# Patient Record
Sex: FEMALE | Race: WHITE | Employment: FULL TIME | URBAN - METROPOLITAN AREA
[De-identification: names, ages, dates, MRNs, and addresses within clinical notes are randomized per-mention and may not be internally consistent; named-entity substitution may affect disease eponyms.]

---

## 2019-03-28 ENCOUNTER — OFFICE VISIT (OUTPATIENT)
Dept: ORTHOPEDIC SURGERY | Age: 49
End: 2019-03-28

## 2019-03-28 VITALS
WEIGHT: 145.4 LBS | TEMPERATURE: 97.8 F | HEART RATE: 63 BPM | BODY MASS INDEX: 22.82 KG/M2 | OXYGEN SATURATION: 98 % | HEIGHT: 67 IN | SYSTOLIC BLOOD PRESSURE: 131 MMHG | DIASTOLIC BLOOD PRESSURE: 75 MMHG

## 2019-03-28 DIAGNOSIS — S06.0X0A CONCUSSION WITHOUT LOSS OF CONSCIOUSNESS, INITIAL ENCOUNTER: Primary | ICD-10-CM

## 2019-03-28 RX ORDER — ONDANSETRON 4 MG/1
4 TABLET, FILM COATED ORAL
Qty: 21 TAB | Refills: 0 | Status: SHIPPED | OUTPATIENT
Start: 2019-03-28 | End: 2019-05-30 | Stop reason: SDUPTHER

## 2019-03-28 RX ORDER — NORTRIPTYLINE HYDROCHLORIDE 10 MG/1
10 CAPSULE ORAL
Qty: 90 CAP | Refills: 0 | Status: SHIPPED | OUTPATIENT
Start: 2019-03-28 | End: 2019-03-28 | Stop reason: SDUPTHER

## 2019-03-28 RX ORDER — ONDANSETRON 4 MG/1
4 TABLET, FILM COATED ORAL
COMMUNITY
End: 2019-03-28 | Stop reason: SDUPTHER

## 2019-03-28 RX ORDER — NORTRIPTYLINE HYDROCHLORIDE 10 MG/1
10 CAPSULE ORAL
Qty: 90 CAP | Refills: 0 | Status: SHIPPED | OUTPATIENT
Start: 2019-03-28 | End: 2019-04-01 | Stop reason: SINTOL

## 2019-03-28 RX ORDER — IBUPROFEN 200 MG
TABLET ORAL
COMMUNITY

## 2019-03-28 RX ORDER — LEVOTHYROXINE SODIUM 75 UG/1
88 TABLET ORAL
COMMUNITY

## 2019-03-28 NOTE — PROGRESS NOTES
AVS reviewed: YES  HEP: AT reviewed  Resources Provided: YES Both Videos & Photos  Patient questions/concerns answered: YES  Patient verbalized understanding of treatment plan: YES

## 2019-03-28 NOTE — LETTER
NOTIFICATION RETURN TO WORK / SCHOOL 
 
3/28/2019 10:50 AM 
 
Ms. Shaina Silver [address] [address] To Whom It May Concern: 
 
Shaina Silver is currently under the care of [address] [place] [address]. She will not work in any capacity due to medical condition and will follow-up with me on 4/4/19. If there are questions or concerns please have the patient contact our office.  
 
 
 
Sincerely, 
 
 
Kennis Cogan, DO

## 2019-03-28 NOTE — PROGRESS NOTES
HISTORY OF PRESENT ILLNESS    Franky Park is a 51y.o. year old female that comes in today as new patient At the request of Dr. 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 It has slightly improved with ibuprofen. Pain rated 5/10 top/frontal head and described as throbbing. Worsened with computer scree and most TV watching. Was eval by work comp doctor at [facility] [address] and then to ER as concern about bleed which was ruled out at ER and using zofran and benadryl. Photophobia: no Phonophobia: yes Sleep issues: yes More emotional: yes Dizziness: yes Nausea: yes LOC: no Trouble concentrating/foggy feeling: yes    Has Hx concussion as 16yo lasted about 2-3 weeks. History reviewed. No pertinent surgical history. Social History     Socioeconomic History    Marital status: UNKNOWN     Spouse name: Not on file    Number of children: Not on file    Years of education: Not on file    Highest education level: Not on file   Tobacco Use    Smoking status: Never Smoker    Smokeless tobacco: Never Used   Substance and Sexual Activity    Alcohol use: Yes     Alcohol/week: 0.6 oz     Types: 1 Glasses of wine per week     Comment: rarely     Drug use: Never     Current Outpatient Medications   Medication Sig Dispense Refill    levothyroxine (SYNTHROID) 75 mcg tablet Take  by mouth Daily (before breakfast).  ondansetron hcl (ZOFRAN) 4 mg tablet Take 4 mg by mouth every eight (8) hours as needed for Nausea.  ibuprofen (MOTRIN IB) 200 mg tablet Take  by mouth. History reviewed. No pertinent past medical history. Family History   Problem Relation Age of Onset    Hypertension Mother     Cancer Mother          ROS:  All other systems reviewed and negative aside from that written in the HPI.       Objective:  /75   Pulse 63   Temp 97.8 °F (36.6 °C) (Oral)   Ht 5' 7\" (1.702 m)   Wt 145 lb 6.4 oz (66 kg)   SpO2 98%   BMI 22.77 kg/m²   GEN: Appears stated age in NAD. EYES: Conjunctiva and lids normal.  PERRL.  ENT: External ears and nose without lesions/trauma. Hearing Intact. Tongue midline. NECK: supple, non-tender and symmetrical.  Spurling negative  HEART: no peripheral edema  LUNGS: Normal effort  NEURO:  CN 2-12 grossly Intact. DTRs normal biceps, triceps, patellar and Achilles bilateral .  Sensation intact to light touch.  within normal limits rapid alternating movements. Romberg/pronator drift within normal limits. Tandem leg balance test (VINAY) positive - 3 errors in 5 sec. Conjugate gaze to 10+cm w/ Sx.  MS: Gait and Station normal.  no clubbing/cyanosis. Strength/tone normal throughout upper and lower extremities. SKIN: Warm/dry w/o rash. HEENT: Conjunctiva/lids WNL. External canals/nares WNL. Tongue midline. PERRL, EOMI. Hearing intact. NECK: Trachea midline. Supple, Full ROM. No thyromegaly. CARDIAC: No edema. LUNGS: Normal effort. ABD: Soft, no masses. No HSM. PSYCH: A+O x3. Appropriate judgment and insight. Assessment/Plan:     ICD-10-CM ICD-9-CM    1. Concussion without loss of consciousness, initial encounter S06.0X0A 850.0 nortriptyline (PAMELOR) 10 mg capsule      ondansetron hcl (ZOFRAN) 4 mg tablet      REFERRAL TO PHYSICAL THERAPY       Patient (or guardian if minor) verbalizes understanding of evaluation and plan. Will start HEP and PT and Rx zofran fror PRN and titrate up pamelor and RTC 1 week. Letter off work.

## 2019-03-28 NOTE — PATIENT INSTRUCTIONS
Stare at the tip of a pen or pencil and bring toward your eye until it just starts to bother you. Hold for 5 seconds then move it away. Repeat 10 times a set, and do 3 sets a day.              Search YouTube for my channel:    Dr. Memo Desai

## 2019-04-01 ENCOUNTER — TELEPHONE (OUTPATIENT)
Dept: ORTHOPEDIC SURGERY | Age: 49
End: 2019-04-01

## 2019-04-01 DIAGNOSIS — S06.0X0A CONCUSSION WITHOUT LOSS OF CONSCIOUSNESS, INITIAL ENCOUNTER: Primary | ICD-10-CM

## 2019-04-01 DIAGNOSIS — S06.0X0A CONCUSSION WITHOUT LOSS OF CONSCIOUSNESS, INITIAL ENCOUNTER: ICD-10-CM

## 2019-04-01 RX ORDER — MEMANTINE HYDROCHLORIDE 5 MG/1
5 TABLET ORAL DAILY
Qty: 30 TAB | Refills: 1 | Status: SHIPPED | OUTPATIENT
Start: 2019-04-01 | End: 2019-06-13 | Stop reason: ALTCHOICE

## 2019-04-01 RX ORDER — MEMANTINE HYDROCHLORIDE 5 MG/1
5 TABLET ORAL DAILY
Qty: 30 TAB | Refills: 1 | Status: SHIPPED | OUTPATIENT
Start: 2019-04-01 | End: 2019-04-01 | Stop reason: SDUPTHER

## 2019-04-01 NOTE — TELEPHONE ENCOUNTER
Pt called stating nortriptyline (PAMELOR) 10 mg capsule gives her vivid dreams and she doesn't sleep but 10-15 minutes at a time. She went to 20mg last night and it was completely worse. She did say in the past she was on [place] and was wondering if she could go back to it. Please advise back at [phone].

## 2019-04-01 NOTE — TELEPHONE ENCOUNTER
Kerri. Have her stop the pamelor and I sent a Rx for namenda 5mg to start at night and she can increase to 10mg after 5 days of needed.

## 2019-04-01 NOTE — TELEPHONE ENCOUNTER
Called and confirmed with pharmacy that they had received the Rx for Namenda that Dr. Cyndy Hartley resent. Pharmacy staff mentioned that they have two patient profiles for the Pt and will make a note to merge the two of them.

## 2019-04-01 NOTE — TELEPHONE ENCOUNTER
Called and informed Pt that confirmed that pharmacy has received Rx and it is filled. Pt was informed that she should bring current insurance and workman's comp information to pharmacy when picking up. Pt stated that that should all be on file and has spoken with  who informed her that the Rx still needs to be approved through her so it may be another day before Rx can be picked up.

## 2019-04-02 ENCOUNTER — DOCUMENTATION ONLY (OUTPATIENT)
Dept: ORTHOPEDIC SURGERY | Age: 49
End: 2019-04-02

## 2019-04-02 NOTE — PROGRESS NOTES
Called , Annabella Rubio, to see where we could send Pt to Radha Adam was confused why there was a problem w/ [place] and stated that she would take care of it.

## 2019-04-03 ENCOUNTER — APPOINTMENT (OUTPATIENT)
Dept: PHYSICAL THERAPY | Age: 49
End: 2019-04-03

## 2019-04-04 ENCOUNTER — OFFICE VISIT (OUTPATIENT)
Dept: ORTHOPEDIC SURGERY | Age: 49
End: 2019-04-04

## 2019-04-04 VITALS
DIASTOLIC BLOOD PRESSURE: 78 MMHG | BODY MASS INDEX: 23.01 KG/M2 | HEIGHT: 67 IN | TEMPERATURE: 98.1 F | RESPIRATION RATE: 14 BRPM | HEART RATE: 65 BPM | WEIGHT: 146.6 LBS | SYSTOLIC BLOOD PRESSURE: 130 MMHG

## 2019-04-04 DIAGNOSIS — S06.0X0D CONCUSSION WITHOUT LOSS OF CONSCIOUSNESS, SUBSEQUENT ENCOUNTER: Primary | ICD-10-CM

## 2019-04-04 NOTE — PROGRESS NOTES
HISTORY OF PRESENT ILLNESS    Nguyễn Cedillo is a 51y.o. year old female that comes in today for follow-up: concussion    Since last appt Sx are somewhat improved but a lot of issues with driving child to school then came here  It has slightly improved with namenda as could not tolerate pamelor at 20mg. Patient has tried:  Tylenol and rest/darrk-quiet room Pain rated  6/10 top right head and described as throbbing. Did have bad squeeze feeling back of head Mon and Tues night and lasted several hours. Still has not figured out [facility] for PT as issues with fee schedule. [facility] improving. Photophobia: yes Phonophobia: yes Sleep issues: yes More emotional: improved Dizziness: yes but improved Nausea: yes better w/ zofran LOC: no Trouble concentrating/foggy feeling: improving    Social History     Socioeconomic History    Marital status: UNKNOWN     Spouse name: Not on file    Number of children: Not on file    Years of education: Not on file    Highest education level: Not on file   Tobacco Use    Smoking status: Never Smoker    Smokeless tobacco: Never Used   Substance and Sexual Activity    Alcohol use: Yes     Alcohol/week: 0.6 oz     Types: 1 Glasses of wine per week     Comment: rarely     Drug use: Never     Current Outpatient Medications   Medication Sig Dispense Refill    memantine (NAMENDA) 5 mg tablet Take 1 Tab by mouth daily. May increase to 10mg after 5 days if needed. 30 Tab 1    levothyroxine (SYNTHROID) 75 mcg tablet Take  by mouth Daily (before breakfast).  ondansetron hcl (ZOFRAN) 4 mg tablet Take 1 Tab by mouth every eight (8) hours as needed for Nausea. 21 Tab 0    ibuprofen (MOTRIN IB) 200 mg tablet Take  by mouth. History reviewed. No pertinent past medical history.   Family History   Problem Relation Age of Onset    Hypertension Mother     Cancer Mother          ROS:  All other systems reviewed and negative aside from that written in the HPI.      Objective:  /78   Pulse 65   Temp 98.1 °F (36.7 °C)   Resp 14   Ht 5' 7\" (1.702 m)   Wt 146 lb 9.6 oz (66.5 kg)   BMI 22.96 kg/m²   GEN: Appears stated age in NAD. EYES: Conjunctiva and lids normal.  PERRL.  ENT: External ears and nose without lesions/trauma. Hearing Intact. Tongue midline. NECK: supple, non-tender and symmetrical.  Spurling negative  HEART: no peripheral edema  LUNGS: Normal effort  NEURO:  CN 2-12 grossly Intact. DTRs normal biceps, triceps, patellar and Achilles bilateral .  Sensation intact to light touch.  within normal limits rapid alternating movements. Romberg/pronator drift within normal limits. Tandem leg balance test (VINAY) negative. Conjugate gaze to 12cm w/ Sx.  MS: Gait and Station normal.  no clubbing/cyanosis. Strength/tone normal throughout upper and lower extremities. SKIN: Warm/dry w/o rash. HEENT: Conjunctiva/lids WNL. External canals/nares WNL. Tongue midline. PERRL, EOMI. Hearing intact. NECK: Trachea midline. Supple, Full ROM. No thyromegaly. CARDIAC: No edema. LUNGS: Normal effort. ABD: Soft, no masses. No HSM. PSYCH: A+O x3. Appropriate judgment and insight. Assessment/Plan:     ICD-10-CM ICD-9-CM    1. Concussion without loss of consciousness, subsequent encounter S06.0X0D V58.89      850.0      Patient (or guardian if minor) verbalizes understanding of evaluation and plan. Time with Pt 26 minutes, >50% of which was counseling pt regarding Dx and Tx options and coordination of care. Slowly improving with namenda and avoiding activity. Will add melatonin OTC slowly and titrate naemda as tolerated and RTC 1 week. Continue eye HEP and await PT scheduling.

## 2019-04-04 NOTE — LETTER
4/4/2019 8:42 AM 
 
Ms. Jamel Mendoza 4 [address] [address] 
  
  
To Whom It May Concern: 
  
Pilo Jones is currently under the care of [address] [place] [address]. 
  
She will not work in any capacity due to medical condition and will follow-up with me on 4/11/19. 
  
If there are questions or concerns please have the patient contact our office.  
 
 
 
 
Sincerely, 
 
 
Susana Arciniega, DO

## 2019-04-04 NOTE — PROGRESS NOTES
AVS reviewed: YES  HEP: N/A  Resources Provided: YES Work note  Patient questions/concerns answered: YES.  Gave update again on status of PT  Patient verbalized understanding of treatment plan: YES

## 2019-04-04 NOTE — LETTER
NOTIFICATION RETURN TO WORK / SCHOOL 
 
4/4/2019 8:34 AM 
 
Ms. Joann Pena 4 [address] [address] To Whom It May Concern: 
 
Joann Pena is currently under the care of [address] [place] [address]. She will not work in any capacity due to medical condition and will follow-up with me on 4/11/19. If there are questions or concerns please have the patient contact our office.  
 
 
 
Sincerely, 
 
 
Jamari Jack, DO

## 2019-04-04 NOTE — PROGRESS NOTES
Pt reports this morning is worse she has felt in a while; had to do a lot of driving  Took Namenda last night, was able to go to sleep. But woke up at 2am w/ pounding ALICEA, took Tylenol & then woke up at 6am and still had HA  In general, has gotten better. Less use of Tylenol & Zofran.  Screens, driving (intersections w/ cross traffic) are aggravating activity

## 2019-04-04 NOTE — LETTER
NOTIFICATION RETURN TO WORK / SCHOOL 
 
4/4/2019 8:44 AM 
 
Ms. Mony Lind 4 [address] [address] 
  
  
To Whom It May Concern: 
  
Mony Lind is currently under the care of [address] [place] [address]. 
  
She will not work in any capacity due to medical condition and will follow-up with me on 4/11/19. 
  
If there are questions or concerns please have the patient contact our office.  
 
 
 
Sincerely, 
 
 
Ham Ackerman, DO

## 2019-04-04 NOTE — Clinical Note
Please call [address] PT at [facility] and let them know about her and fax the order over to see if we can get her started there (or another location if needed). Thanks Ronald.

## 2019-04-04 NOTE — PATIENT INSTRUCTIONS
Concussion: Care Instructions  Your Care Instructions    A concussion is a kind of injury to the brain. It happens when the head receives a hard blow. The impact can jar or shake the brain against the skull. This interrupts the brain's normal activities. Although you may have cuts or bruises on your head or face, you may have no other visible signs of a brain injury. In most cases, damage to the brain from a concussion can't be seen in tests such as a CT or MRI scan. For a few weeks, you may have low energy, dizziness, trouble sleeping, a headache, ringing in your ears, or nausea. You may also feel anxious, grumpy, or depressed. You may have problems with memory and concentration. These symptoms are common after a concussion. They should slowly improve over time. Sometimes this takes weeks or even months. Someone who lives with you should know how to care for you. Please share this and all information with a caregiver who will be available to help if needed. Follow-up care is a key part of your treatment and safety. Be sure to make and go to all appointments, and call your doctor if you are having problems. It's also a good idea to know your test results and keep a list of the medicines you take. How can you care for yourself at home? Pain control  · Put ice or a cold pack on the part of your head that hurts for 10 to 20 minutes at a time. Put a thin cloth between the ice and your skin. · Be safe with medicines. Read and follow all instructions on the label. ? If the doctor gave you a prescription medicine for pain, take it as prescribed. ? If you are not taking a prescription pain medicine, ask your doctor if you can take an over-the-counter medicine. Recovery  · Follow your doctor's instructions. He or she will tell you if you need someone to watch you closely for the next 24 hours or longer. · Rest is the best way to recover from a concussion.  You need to rest your body and your brain:  ? Get plenty of sleep at night. And take rest breaks during the day. ? Avoid activities that take a lot of physical or mental work. This includes housework, exercise, schoolwork, video games, text messaging, and using the computer. ? You may need to change your school or work schedule while you recover. ? Return to your normal activities slowly. Do not try to do too much at once. · Do not drink alcohol or use illegal drugs. Alcohol and illegal drugs can slow your recovery. And they can increase your risk of a second brain injury. · Avoid activities that could lead to another concussion. Follow your doctor's instructions for a gradual return to activity and sports. · Ask your doctor when it's okay for you to drive a car, ride a bike, or operate machinery. How should you return to activity? Your return to activity can begin after 1 to 2 days of physical and mental rest. After resting, you can gradually increase your activity as long as it does not cause new symptoms or worsen your symptoms. Doctors and concussion specialists suggest steps to follow for returning to sports after a concussion. Use these steps as a guide. You should slowly progress through the following levels of activity:  1. Limited activity. You can take part in daily activities as long as the activity doesn't increase your symptoms or cause new symptoms. 2. Light aerobic activity. This can include walking, swimming, or other exercise at less than 70% of maximum heart rate. No resistance training is included in this step. 3. Sport-specific exercise. This includes running drills or skating drills (depending on the sport), but no head impact. 4. Noncontact training drills. This includes more complex training drills such as passing. The athlete may also begin light resistance training. 5. Full-contact practice. The athlete can participate in normal training. 6. Return to normal game play.  This is the final step and allows the athlete to join in normal game play. Watch and keep track of your progress. It should take at least 6 days for you to go from light activity to normal game play. Make sure that you can stay at each new level of activity for at least 24 hours without symptoms, or as long as your doctor says, before doing more. If one or more symptoms come back, return to a lower level of activity for at least 24 hours. Don't move on until all symptoms are gone. When should you call for help? Call 911 anytime you think you may need emergency care. For example, call if:    · You have a seizure.     · You passed out (lost consciousness).     · You are confused or can't stay awake.    Call your doctor now or seek immediate medical care if:    · You have new or worse vomiting.     · You feel less alert.     · You have new weakness or numbness in any part of your body.    Watch closely for changes in your health, and be sure to contact your doctor if:    · You do not get better as expected.     · You have new symptoms, such as headaches, trouble concentrating, or changes in mood. Where can you learn more? Go to http://bekah-itz.info/. Enter L141 in the search box to learn more about \"Concussion: Care Instructions. \"  Current as of: Madeline 3, 2018  Content Version: 11.9  © 8566-1987 [employer]. Care instructions adapted under license by [employer] (which disclaims liability or warranty for this information). If you have questions about a medical condition or this instruction, always ask your healthcare professional. [place] [address] any warranty or liability for your use of this information.

## 2019-04-11 ENCOUNTER — OFFICE VISIT (OUTPATIENT)
Dept: ORTHOPEDIC SURGERY | Age: 49
End: 2019-04-11

## 2019-04-11 VITALS
WEIGHT: 149 LBS | BODY MASS INDEX: 23.39 KG/M2 | SYSTOLIC BLOOD PRESSURE: 130 MMHG | HEART RATE: 67 BPM | DIASTOLIC BLOOD PRESSURE: 79 MMHG | HEIGHT: 67 IN | TEMPERATURE: 97.6 F | RESPIRATION RATE: 15 BRPM

## 2019-04-11 DIAGNOSIS — S06.0X0D CONCUSSION WITHOUT LOSS OF CONSCIOUSNESS, SUBSEQUENT ENCOUNTER: Primary | ICD-10-CM

## 2019-04-11 RX ORDER — ACETAMINOPHEN 325 MG/1
TABLET ORAL
COMMUNITY

## 2019-04-11 NOTE — PROGRESS NOTES
AVS reviewed: YES  HEP: N/A  Resources Provided: YES work note  Patient questions/concerns answered: YES, Pt requested work note  Patient verbalized understanding of treatment plan: YES

## 2019-04-11 NOTE — LETTER
4/11/2019 8:55 AM 
 
Ms. Hill Sanchez 4 [address] [address] To Whom It May Concern: 
  
Hill Sanchez is currently under the care of [address] [place] [address]. 
  
She will not work in any capacity due to medical condition and will follow-up with me in two weeks. 
  
If there are questions or concerns please have the patient contact our office.  
 
 
 
Sincerely, 
 
 
Den Mann, DO

## 2019-04-11 NOTE — PROGRESS NOTES
HISTORY OF PRESENT ILLNESS    Simba Cotto is a 51y.o. year old female that comes in today for follow-up: concussion    Since last appt Sx are slowly improving with namenda 10mg which helped sleep and has used melatonin as well with varying results and will wake in a few hours but considering extended release melatonin. Pain rated  0 - No pain/10 in head now despite driving which bothered her a lot prior. Will get HA throb top head but less often and less severe. Has started PT 2 daysa go and noticing improvement. Using zofran here and there. Photophobia: yes much improved Phonophobia: no Sleep issues: yes More emotional: improved Dizziness: very rare only with quick motions. Nausea: yes LOC: no Trouble concentrating/foggy feeling: yes    Social History     Socioeconomic History    Marital status: UNKNOWN     Spouse name: Not on file    Number of children: Not on file    Years of education: Not on file    Highest education level: Not on file   Tobacco Use    Smoking status: Never Smoker    Smokeless tobacco: Never Used   Substance and Sexual Activity    Alcohol use: Yes     Alcohol/week: 0.6 oz     Types: 1 Glasses of wine per week     Comment: rarely     Drug use: Never     Current Outpatient Medications   Medication Sig Dispense Refill    acetaminophen (TYLENOL) 325 mg tablet Take  by mouth every four (4) hours as needed for Pain.  memantine (NAMENDA) 5 mg tablet Take 1 Tab by mouth daily. May increase to 10mg after 5 days if needed. 30 Tab 1    levothyroxine (SYNTHROID) 75 mcg tablet Take  by mouth Daily (before breakfast).  ondansetron hcl (ZOFRAN) 4 mg tablet Take 1 Tab by mouth every eight (8) hours as needed for Nausea. 21 Tab 0    ibuprofen (MOTRIN IB) 200 mg tablet Take  by mouth. History reviewed. No pertinent past medical history.   Family History   Problem Relation Age of Onset    Hypertension Mother     Cancer Mother          ROS:  All other systems reviewed and negative aside from that written in the HPI. Objective:  /79   Pulse 67   Temp 97.6 °F (36.4 °C)   Resp 15   Ht 5' 7\" (1.702 m)   Wt 149 lb (67.6 kg)   BMI 23.34 kg/m²   GEN: Appears stated age in NAD. EYES: Conjunctiva and lids normal.  PERRL.  ENT: External ears and nose without lesions/trauma. Hearing Intact. Tongue midline. NECK: supple, non-tender and symmetrical.  Spurling negative  HEART: no peripheral edema  LUNGS: Normal effort  NEURO:  CN 2-12 grossly Intact. DTRs normal biceps, triceps, patellar and achilles bilateral .  Sensation intact to light touch.  within normal limits rapid alternating movements. Romberg/pronator drift within normal limits. Tandem leg balance test (VINAY) positive - 6 errors. Conjugate gaze to 10cm w/ Sx.  MS: Gait and Station normal.  no clubbing/cyanosis. Strength/tone normal throughout upper and lower extremities. SKIN: Warm/dry w/o rash. HEENT: Conjunctiva/lids WNL. External canals/nares WNL. Tongue midline. PERRL, EOMI. Hearing intact. NECK: Trachea midline. Supple, Full ROM. No thyromegaly. CARDIAC: No edema. LUNGS: Normal effort. ABD: Soft, no masses. No HSM. PSYCH: A+O x3. Appropriate judgment and insight. Assessment/Plan:     ICD-10-CM ICD-9-CM    1. Concussion without loss of consciousness, subsequent encounter S06.0X0D V58.89      850.0        Patient (or guardian if minor) verbalizes understanding of evaluation and plan. Will continue avoid aggravating activities and continue PT and nameda w/ melatonin PRN and RTC 2 week. Time with Pt 27 minutes, >50% of which was counseling pt regarding Dx and Tx options and coordination of care.

## 2019-04-11 NOTE — PROGRESS NOTES
Pt rates feeling good right now, but has had some symptoms I.e. nausea  Made trip to grocery store and was able to make it though trip to  a few things with sunglasses on   Pt reports still having trouble sleeping

## 2019-04-11 NOTE — LETTER
4/11/2019 8:57 AM 
 
Ms. Lars Lin [address] [address] To Whom It May Concern: 
  
Lars Lin is currently under the care of [address] [place] [address]. 
  
She will not work in any capacity due to medical condition and will follow-up with me on 4/25/19. 
  
If there are questions or concerns please have the patient contact our office.  
 
 
 
 
 
Sincerely, 
 
 
Leonardo Hook, DO

## 2019-04-25 ENCOUNTER — OFFICE VISIT (OUTPATIENT)
Dept: ORTHOPEDIC SURGERY | Age: 49
End: 2019-04-25

## 2019-04-25 VITALS
TEMPERATURE: 98 F | OXYGEN SATURATION: 98 % | WEIGHT: 148 LBS | DIASTOLIC BLOOD PRESSURE: 66 MMHG | HEIGHT: 67 IN | SYSTOLIC BLOOD PRESSURE: 144 MMHG | BODY MASS INDEX: 23.23 KG/M2 | HEART RATE: 60 BPM

## 2019-04-25 DIAGNOSIS — S06.0X0D CONCUSSION WITHOUT LOSS OF CONSCIOUSNESS, SUBSEQUENT ENCOUNTER: Primary | ICD-10-CM

## 2019-04-25 RX ORDER — ZOLPIDEM TARTRATE 5 MG/1
5 TABLET ORAL
COMMUNITY
End: 2021-09-14 | Stop reason: SDUPTHER

## 2019-04-25 RX ORDER — MIRTAZAPINE 7.5 MG/1
TABLET, FILM COATED ORAL
Qty: 90 TAB | Refills: 0 | Status: SHIPPED | OUTPATIENT
Start: 2019-04-25 | End: 2019-05-09 | Stop reason: SDUPTHER

## 2019-04-25 NOTE — PATIENT INSTRUCTIONS
Continue to avoid aggravating activities, PT, & eye exercises. Stare at the tip of a pen or pencil and bring toward your eye until it just starts to bother you. Hold for 5 seconds then move it away. Repeat 10 times a set, and do 3 sets a day.

## 2019-04-25 NOTE — PROGRESS NOTES
HISTORY OF PRESENT ILLNESS    Jewel Hayes is a 51y.o. year old female that comes in today for follow-up: concussion    Since last appt Sx are improved over as has had 2 full days where no Rx needed. It is unchanged with namenda. Melatonin and mediation recently has helped. Pain rated  1/10 throb/pressuire top head but less freequent.  + issues with word finding/stammering worse lately. Driving causes less Sx as well. Also belches often during some activities. Photophobia: yes Phonophobia: yes Sleep issues: yes More emotional: yes Dizziness: no Nausea: much improved LOC: no Trouble concentrating/foggy feeling: no      Social History     Socioeconomic History    Marital status: UNKNOWN     Spouse name: Not on file    Number of children: Not on file    Years of education: Not on file    Highest education level: Not on file   Tobacco Use    Smoking status: Never Smoker    Smokeless tobacco: Never Used   Substance and Sexual Activity    Alcohol use: Yes     Alcohol/week: 0.6 oz     Types: 1 Glasses of wine per week     Comment: rarely     Drug use: Never     Current Outpatient Medications   Medication Sig Dispense Refill    zolpidem (AMBIEN) 5 mg tablet Take 5 mg by mouth.  acetaminophen (TYLENOL) 325 mg tablet Take  by mouth every four (4) hours as needed for Pain.  memantine (NAMENDA) 5 mg tablet Take 1 Tab by mouth daily. May increase to 10mg after 5 days if needed. 30 Tab 1    levothyroxine (SYNTHROID) 75 mcg tablet Take  by mouth Daily (before breakfast).  ibuprofen (MOTRIN IB) 200 mg tablet Take  by mouth.  ondansetron hcl (ZOFRAN) 4 mg tablet Take 1 Tab by mouth every eight (8) hours as needed for Nausea. 21 Tab 0     History reviewed. No pertinent past medical history. Family History   Problem Relation Age of Onset    Hypertension Mother     Cancer Mother          ROS:  All other systems reviewed and negative aside from that written in the HPI.       Objective:  /66 Pulse 60   Temp 98 °F (36.7 °C) (Oral)   Ht 5' 7\" (1.702 m)   Wt 148 lb (67.1 kg)   SpO2 98%   BMI 23.18 kg/m²   GEN: Appears stated age in NAD. EYES: Conjunctiva and lids normal.  PERRL.  ENT: External ears and nose without lesions/trauma. Hearing Intact. Tongue midline. NECK: supple, non-tender and symmetrical.  Spurling negative  HEART: no peripheral edema  LUNGS: Normal effort  NEURO:  CN 2-12 grossly Intact. DTRs normal biceps, triceps, patellar and achilles bilateral .  Sensation intact to light touch.  within normal limits rapid alternating movements. Romberg/pronator drift within normal limits. Tandem leg balance test (VINAY) positive - 6 errors. Conjugate gaze to 10cm w/ Sx.  MS: Gait and Station normal.  no clubbing/cyanosis. Strength/tone normal throughout upper and lower extremities. SKIN: Warm/dry w/o rash. Assessment/Plan:     ICD-10-CM ICD-9-CM    1. Concussion without loss of consciousness, subsequent encounter S06.0X0D V58.89 mirtazapine (REMERON) 7.5 mg tablet     850.0        Patient (or guardian if minor) verbalizes understanding of evaluation and plan. Will continue avoid aggravating activities and change namenda to remeron as significant issues with sleep. Continue eye HEP and PT.

## 2019-04-25 NOTE — PROGRESS NOTES
AVS reviewed: YES  HEP: N/A.  Pt already aware of HEP  Resources Provided: YES work note  Patient questions/concerns answered: NO. Pt denied questions/concerns  Patient verbalized understanding of treatment plan: YES

## 2019-05-09 ENCOUNTER — OFFICE VISIT (OUTPATIENT)
Dept: ORTHOPEDIC SURGERY | Age: 49
End: 2019-05-09

## 2019-05-09 VITALS
HEART RATE: 73 BPM | OXYGEN SATURATION: 100 % | BODY MASS INDEX: 23.07 KG/M2 | DIASTOLIC BLOOD PRESSURE: 79 MMHG | HEIGHT: 67 IN | SYSTOLIC BLOOD PRESSURE: 130 MMHG | WEIGHT: 147 LBS | TEMPERATURE: 98 F

## 2019-05-09 DIAGNOSIS — S06.0X0D CONCUSSION WITHOUT LOSS OF CONSCIOUSNESS, SUBSEQUENT ENCOUNTER: Primary | ICD-10-CM

## 2019-05-09 RX ORDER — MIRTAZAPINE 15 MG/1
TABLET, FILM COATED ORAL
Qty: 30 TAB | Refills: 2 | Status: SHIPPED | OUTPATIENT
Start: 2019-05-09 | End: 2019-07-01 | Stop reason: ALTCHOICE

## 2019-05-09 NOTE — PROGRESS NOTES
AVS reviewed: YES  HEP: Pt declined demo  Resources Provided: YES work note  Patient questions/concerns answered: YES.  Provided copies for  Torin Ardon  Patient verbalized understanding of treatment plan: YES    Signed PT progress note faxed to [facility]

## 2019-05-09 NOTE — PROGRESS NOTES
HISTORY OF PRESENT ILLNESS    Bogdan Duffy is a 51y.o. year old female that comes in today for follow-up: concussion    Since last appt Sx are improved and pleased with worker's comp rep here today to discuss case. It has improved with changing namenda to remeron and now at 3 tabs at bed but some issues with restlessness improved with melatonin. Pain rated  0 - No pain/10 pressure/throb top head much less often as now < daily. Has been doing well with PT [facility]) but has issues with paperwork for job which she tried last week and caused terrible HA - also loud and fluorescent lights which made her HA terrible and + nausea and had just come from PT. Photophobia: yes Phonophobia: yes Sleep issues: yes improved with remeron 3 tabs More emotional: yes Dizziness: no Nausea: much improved LOC: no Trouble concentrating/foggy feeling: no      Social History     Socioeconomic History    Marital status: UNKNOWN     Spouse name: Not on file    Number of children: Not on file    Years of education: Not on file    Highest education level: Not on file   Tobacco Use    Smoking status: Never Smoker    Smokeless tobacco: Never Used   Substance and Sexual Activity    Alcohol use: Yes     Alcohol/week: 0.6 oz     Types: 1 Glasses of wine per week     Comment: rarely     Drug use: Never     Current Outpatient Medications   Medication Sig Dispense Refill    zolpidem (AMBIEN) 5 mg tablet Take 5 mg by mouth.  mirtazapine (REMERON) 7.5 mg tablet Start 1 tab at bed. May increase by additional tab if needed after 3 days up to max daily 3 tabs. 90 Tab 0    acetaminophen (TYLENOL) 325 mg tablet Take  by mouth every four (4) hours as needed for Pain.  levothyroxine (SYNTHROID) 75 mcg tablet Take  by mouth Daily (before breakfast).  ibuprofen (MOTRIN IB) 200 mg tablet Take  by mouth.  ondansetron hcl (ZOFRAN) 4 mg tablet Take 1 Tab by mouth every eight (8) hours as needed for Nausea.  21 Tab 0    memantine (NAMENDA) 5 mg tablet Take 1 Tab by mouth daily. May increase to 10mg after 5 days if needed. 30 Tab 1     History reviewed. No pertinent past medical history. Family History   Problem Relation Age of Onset    Hypertension Mother     Cancer Mother          ROS:  All other systems reviewed and negative aside from that written in the HPI. Objective:  /79   Pulse 73   Temp 98 °F (36.7 °C) (Oral)   Ht 5' 7\" (1.702 m)   Wt 147 lb (66.7 kg)   SpO2 100%   BMI 23.02 kg/m²   GEN: Appears stated age in NAD. EYES: Conjunctiva and lids normal.  PERRL.  ENT: External ears and nose without lesions/trauma.  Hearing Intact.  Tongue midline. NECK: supple, non-tender and symmetrical.  Spurling negative  HEART: no peripheral edema  LUNGS: Normal effort  NEURO:  CN 2-12 grossly Intact.  DTRs normal biceps, triceps, patellar and achilles bilateral .  Sensation intact to light touch.  within normal limits rapid alternating movements.  Romberg/pronator drift within normal limits.  Conjugate gaze to 10cm w/ Sx. Tandem VINAY 4 errors. MS: Gait and Station normal.  no clubbing/cyanosis.  Strength/tone normal throughout upper and lower extremities. SKIN: Warm/dry w/o rash. Assessment/Plan:     ICD-10-CM ICD-9-CM    1. Concussion without loss of consciousness, subsequent encounter S06.0X0D V58.89 mirtazapine (REMERON) 15 mg tablet     850.0      Patient (or guardian if minor) verbalizes understanding of evaluation and plan. Will continue PT/eye HEP and remeron can try 30mg (will write Rx for 15mg) w/ melatonin and RTC 10 days. Discussed with  Leana Camarillo and will complete FMLA.

## 2019-05-20 ENCOUNTER — OFFICE VISIT (OUTPATIENT)
Dept: ORTHOPEDIC SURGERY | Age: 49
End: 2019-05-20

## 2019-05-20 VITALS
BODY MASS INDEX: 23.54 KG/M2 | RESPIRATION RATE: 15 BRPM | SYSTOLIC BLOOD PRESSURE: 137 MMHG | TEMPERATURE: 98.1 F | DIASTOLIC BLOOD PRESSURE: 81 MMHG | HEART RATE: 77 BPM | HEIGHT: 67 IN | WEIGHT: 150 LBS

## 2019-05-20 DIAGNOSIS — S06.0X0D CONCUSSION WITHOUT LOSS OF CONSCIOUSNESS, SUBSEQUENT ENCOUNTER: Primary | ICD-10-CM

## 2019-05-20 NOTE — PROGRESS NOTES
AVS reviewed: YES  HEP: N/A  Resources Provided: YES Referrals x3; copies given to   Patient questions/concerns answered: NO. Pt denied questions/concerns  Patient verbalized understanding of treatment plan: YES

## 2019-05-20 NOTE — Clinical Note
Sending her your way for an eval.  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 Thank you.

## 2019-05-20 NOTE — PROGRESS NOTES
HISTORY OF PRESENT ILLNESS    Nicky Quintero is a 51y.o. year old female that comes in today for follow-up: concussion    Since last appt Sx are much improved but still gets HA  It has improved with remeron 30mg. Pain rated  8/10 top head and described as pressure/throb. PT 2/week and hoping for speech consult as issues with word finding and very frustrating with meaning to say something but now something. 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 No issues in car driving last week. Also starting back at gym as cleared by PT and doing cardio brisk walk w/ good incline and some weights up to an hour w/o Sx now. Photophobia: yes Phonophobia: yes Sleep issues: no - much improved More emotional: yes Dizziness: yes Nausea: resolvd LOC: no Trouble concentrating/foggy feeling: no    Social History     Socioeconomic History    Marital status: UNKNOWN     Spouse name: Not on file    Number of children: Not on file    Years of education: Not on file    Highest education level: Not on file   Tobacco Use    Smoking status: Never Smoker    Smokeless tobacco: Never Used   Substance and Sexual Activity    Alcohol use: Yes     Alcohol/week: 0.6 oz     Types: 1 Glasses of wine per week     Comment: rarely     Drug use: Never     Current Outpatient Medications   Medication Sig Dispense Refill    mirtazapine (REMERON) 15 mg tablet Take 15-30mg at bed as needed (can combine with 7.5mg from prior). 30 Tab 2    acetaminophen (TYLENOL) 325 mg tablet Take  by mouth every four (4) hours as needed for Pain.  levothyroxine (SYNTHROID) 75 mcg tablet Take  by mouth Daily (before breakfast).  ibuprofen (MOTRIN IB) 200 mg tablet Take  by mouth.  ondansetron hcl (ZOFRAN) 4 mg tablet Take 1 Tab by mouth every eight (8) hours as needed for Nausea. 21 Tab 0    zolpidem (AMBIEN) 5 mg tablet Take 5 mg by mouth.       memantine (NAMENDA) 5 mg tablet Take 1 Tab by mouth daily. May increase to 10mg after 5 days if needed. 30 Tab 1     History reviewed. No pertinent past medical history. Family History   Problem Relation Age of Onset    Hypertension Mother     Cancer Mother          ROS:  All other systems reviewed and negative aside from that written in the HPI. Objective:  /81   Pulse 77   Temp 98.1 °F (36.7 °C)   Resp 15   Ht 5' 7\" (1.702 m)   Wt 150 lb (68 kg)   BMI 23.49 kg/m²   GEN: Appears stated age in NAD. EYES: Conjunctiva and lids normal.  PERRL.  ENT: External ears and nose without lesions/trauma.  Hearing Intact.  Tongue midline. NECK: supple, non-tender and symmetrical.  Spurling negative  HEART: no peripheral edema  LUNGS: Normal effort  NEURO:  CN 2-12 grossly Intact.  DTRs normal biceps, triceps, patellar and achilles bilateral .  Sensation intact to light touch.  within normal limits rapid alternating movements.  Romberg/pronator drift within normal limits.  Conjugate gaze to 8cm w/ Sx. Tandem VINAY 1 error. MS: Gait and Station normal.  no clubbing/cyanosis.  Strength/tone normal throughout upper and lower extremities. SKIN: Warm/dry w/o rash. Assessment/Plan:     ICD-10-CM ICD-9-CM    1. Concussion without loss of consciousness, subsequent encounter S06.0X0D V58.89 REFERRAL TO SPEECH THERAPY     850.0 REFERRAL TO NEUROPSYCHOLOGY      REFERRAL TO PHYSICAL THERAPY       Patient (or guardian if minor) verbalizes understanding of evaluation and plan. Will continue avoid aggravating activities and continue remeron, PT and refer speech and neuropsych and RTC 2 weeks. Time with Pt 27 minutes, >50% of which was counseling pt regarding Dx and Tx options and coordination of care.

## 2019-05-20 NOTE — LETTER
NOTIFICATION RETURN TO WORK / SCHOOL 
 
5/20/2019 10:58 AM 
 
Ms. Jamel Mendoza [address] [place] [address] To Whom It May Concern: 
 
Jamel Mendoza is currently under the care of [address] [place] [address]. She will not work in any capacity due to medical condition and will follow-up with me on 5/30/19. If there are questions or concerns please have the patient contact our office.  
 
 
 
Sincerely, 
 
 
Susana Arciniega, DO

## 2019-05-30 ENCOUNTER — OFFICE VISIT (OUTPATIENT)
Dept: ORTHOPEDIC SURGERY | Age: 49
End: 2019-05-30

## 2019-05-30 VITALS
SYSTOLIC BLOOD PRESSURE: 123 MMHG | HEIGHT: 67 IN | DIASTOLIC BLOOD PRESSURE: 72 MMHG | RESPIRATION RATE: 20 BRPM | HEART RATE: 69 BPM | BODY MASS INDEX: 23.98 KG/M2 | WEIGHT: 152.8 LBS | OXYGEN SATURATION: 100 %

## 2019-05-30 DIAGNOSIS — S06.0X0A CONCUSSION WITHOUT LOSS OF CONSCIOUSNESS, INITIAL ENCOUNTER: ICD-10-CM

## 2019-05-30 RX ORDER — ONDANSETRON 4 MG/1
4 TABLET, FILM COATED ORAL
Qty: 21 TAB | Refills: 0 | Status: SHIPPED | OUTPATIENT
Start: 2019-05-30 | End: 2019-12-02 | Stop reason: SDUPTHER

## 2019-05-30 NOTE — PROGRESS NOTES
AVS reviewed: YES  HEP: N/A  Resources Provided: YES work note  Patient questions/concerns answered: NO. Pt denied questions/concerns  Patient verbalized understanding of treatment plan: YES

## 2019-05-30 NOTE — PROGRESS NOTES
HISTORY OF PRESENT ILLNESS    Vamsi Lopez is a 51y.o. year old female that comes in today for follow-up: concussion    Since last appt Sx are slowly improving. Patient has tried:  speech therapy eval yesterday and plan for 2/week. PT also going well but did both OT/PT yesterday and bad Sx after. Pain rated  0 - No pain/10 top head and described as pressure progressed to frontal throb severe. Uses tylenol and ibuprofen with relief. Also has neuropsych appt scheduled with Dr. Trish Shaw on 6/17/19. Remeron 30mg pretty good w/ melatonin but some issues getting to sleep so colby take remeron 3 hours prior to bed time. Was able to tolerate work for 1.5 hours but bad Sx after. Photophobia: yes Phonophobia: yes Sleep issues: no - much improved More emotional: yes Dizziness: yes Nausea: yes w/ bad HA - zofran helps LOC: no Trouble concentrating/foggy feeling: no    Social History     Socioeconomic History    Marital status: UNKNOWN     Spouse name: Not on file    Number of children: Not on file    Years of education: Not on file    Highest education level: Not on file   Tobacco Use    Smoking status: Never Smoker    Smokeless tobacco: Never Used   Substance and Sexual Activity    Alcohol use: Yes     Alcohol/week: 0.6 oz     Types: 1 Glasses of wine per week     Comment: rarely     Drug use: Never     Current Outpatient Medications   Medication Sig Dispense Refill    mirtazapine (REMERON) 15 mg tablet Take 15-30mg at bed as needed (can combine with 7.5mg from prior). 30 Tab 2    zolpidem (AMBIEN) 5 mg tablet Take 5 mg by mouth.  acetaminophen (TYLENOL) 325 mg tablet Take  by mouth every four (4) hours as needed for Pain.  memantine (NAMENDA) 5 mg tablet Take 1 Tab by mouth daily. May increase to 10mg after 5 days if needed. 30 Tab 1    levothyroxine (SYNTHROID) 75 mcg tablet Take  by mouth Daily (before breakfast).  ibuprofen (MOTRIN IB) 200 mg tablet Take  by mouth.       ondansetron hcl (ZOFRAN) 4 mg tablet Take 1 Tab by mouth every eight (8) hours as needed for Nausea. 21 Tab 0     History reviewed. No pertinent past medical history. Family History   Problem Relation Age of Onset    Hypertension Mother     Cancer Mother          ROS:  All other systems reviewed and negative aside from that written in the HPI. Objective:  /72 (BP 1 Location: Left arm, BP Patient Position: Sitting)   Pulse 69   Resp 20   Ht 5' 7\" (1.702 m)   Wt 152 lb 12.8 oz (69.3 kg)   SpO2 100%   BMI 23.93 kg/m²   GEN: Appears stated age in NAD. EYES: Conjunctiva and lids normal.  PERRL.  ENT: External ears and nose without lesions/trauma. Hearing Intact. Tongue midline. NECK: supple, non-tender and symmetrical.  Spurling negative  HEART: no peripheral edema  LUNGS: Normal effort  NEURO:  CN 2-12 grossly Intact. DTRs normal biceps, triceps, patellar and Achilles bilateral .  Sensation intact to light touch.  within normal limits rapid alternating movements. Romberg/pronator drift within normal limits. Tandem leg balance test (VINAY) no errors. Conjugate gaze to 8cm w/ Sx (much improved). MS: Gait and Station normal.  no clubbing/cyanosis. Strength/tone normal throughout upper and lower extremities. SKIN: Warm/dry w/o rash. HEENT: Conjunctiva/lids WNL. External canals/nares WNL. Tongue midline. PERRL, EOMI. Hearing intact. NECK: Trachea midline. Supple, Full ROM. No thyromegaly. CARDIAC: No edema. LUNGS: Normal effort. ABD: Soft, no masses. No HSM. PSYCH: A+O x3. Appropriate judgment and insight. Assessment/Plan:     ICD-10-CM ICD-9-CM    1. Concussion without loss of consciousness, initial encounter S06.0X0A 850.0 ondansetron hcl (ZOFRAN) 4 mg tablet       Patient (or guardian if minor) verbalizes understanding of evaluation and plan. Will continue speech therapy, PT and await appt w/ neuropsych. Refill zofran.   Discussed her desire to return to work in limited capacity but Sx bad when there and unable to tolerate 1.5 hrs nor speech+PT as will get Sx 20-30 minutes after. RTC 2 weeks. Time with Pt 27 minutes, >50% of which was counseling pt regarding Dx and Tx options and coordination of care.

## 2019-05-30 NOTE — LETTER
NOTIFICATION RETURN TO WORK / SCHOOL 
 
5/30/2019 11:20 AM 
 
Ms. Linus Gibson [address] [place] [address] To Whom It May Concern: 
 
Linus Gibson is currently under the care of [address] [place] [address]. She will not work in any capacity due to medical condition and will follow-up with me on 6/13/19. If there are questions or concerns please have the patient contact our office.  
 
 
 
Sincerely, 
 
 
Mere Lynn, DO

## 2019-06-04 ENCOUNTER — TELEPHONE (OUTPATIENT)
Dept: ORTHOPEDIC SURGERY | Age: 49
End: 2019-06-04

## 2019-06-04 DIAGNOSIS — S06.0X0D CONCUSSION WITHOUT LOSS OF CONSCIOUSNESS, SUBSEQUENT ENCOUNTER: Primary | ICD-10-CM

## 2019-06-04 NOTE — TELEPHONE ENCOUNTER
Patient called for Chrystal Villatoro. Patient said she can not sleep. That she has not slept in the last four nights. Patient would like to know if she can increase the Remeron medication that was prescribed by Chrystal Villatoro on 05/09/2019. Patient tel. [phone].

## 2019-06-04 NOTE — TELEPHONE ENCOUNTER
Contacted pt at her home number. Informed her that recommendation was to increase medication to 37.5 mg of Remeron. Pt also questioned the use of hyperbaric chamber for the concussion.  Pt states that she has a friend that her daughter had a concussion and was ordered the hyperbaric chamber

## 2019-06-05 NOTE — TELEPHONE ENCOUNTER
06/05/2019 Patient called back to speak to [facility] or [facility]. Patient stated that her chiropractor does have protocol for concussion treatment. Her chiropractor is Dr. Victor M Rodriguez  email [email or web address] and phone is [phone]. Dr. Alena Manuel can reach him at that number ref Dr. Raiza Calderon concussion treatment recommendation. Patient would like for [facility] or [facility] to call Dr. Raiza Calderon today. Also Mrs. Dia Larios lets us know we would have to send order to her worker comp adjustor fax no [phone] Att: Carmela Cowden nurse.

## 2019-06-05 NOTE — TELEPHONE ENCOUNTER
Contacted pt at her home number. Pt states that she will contact her chiropractor and see if they have a protocol for concussions. Pt states that she will call us back to let us know if they do or dont and will also call her  to make sure that it will be covered under workmans comp. Pt thanked writer for call and information.

## 2019-06-06 NOTE — TELEPHONE ENCOUNTER
Called and talked to Pt who informed AT that just ARENDAL, the [employer], needs the order for the hyperbaric chamber; Dr. Yaritza Mcneill office does not.

## 2019-06-06 NOTE — TELEPHONE ENCOUNTER
Pt had called to inform office that [facility] had not received fax yet. AT informed Pt that had attempted to fax x4 without success. Pt corrected fax number for AT. AT faxed order to [facility]in at [phone]. Fax confirmation received. Called Dr. Adela Patterson office to acquire fax number to fax over order. Was informed that they do not need the order as it will not be billed through insurance. AT informed Dr. Adela Patterson office that it will be taken care of through workman's comp.

## 2019-06-07 NOTE — TELEPHONE ENCOUNTER
Called and spoke to office staff of Dr. Tara Epperson who stated that he would return call in 3-5 minutes.

## 2019-06-07 NOTE — TELEPHONE ENCOUNTER
Dr. Hernan Newsome called in states that he received a message regarding a hyperbaric chamber from Dr. Maru Smith office. Dr. Carol Farmer can be reached at his office # [phone] or his cell [phone].

## 2019-06-07 NOTE — TELEPHONE ENCOUNTER
Dr. Pepito Winter returned call to ask that order be faxed to his office. He also provided info on the concussion protocol for the hyperbaric tx. States typically 3-5x/week for 10 visits. Stated that he would keep our office updated on her tx and progress.      Fax: [phone]

## 2019-06-13 ENCOUNTER — OFFICE VISIT (OUTPATIENT)
Dept: ORTHOPEDIC SURGERY | Age: 49
End: 2019-06-13

## 2019-06-13 VITALS
BODY MASS INDEX: 23.92 KG/M2 | RESPIRATION RATE: 15 BRPM | TEMPERATURE: 97.5 F | WEIGHT: 152.4 LBS | SYSTOLIC BLOOD PRESSURE: 142 MMHG | HEART RATE: 70 BPM | DIASTOLIC BLOOD PRESSURE: 78 MMHG | HEIGHT: 67 IN

## 2019-06-13 DIAGNOSIS — S06.0X0D CONCUSSION WITHOUT LOSS OF CONSCIOUSNESS, SUBSEQUENT ENCOUNTER: Primary | ICD-10-CM

## 2019-06-13 NOTE — PROGRESS NOTES
AVS reviewed: YES  HEP: N/A  Resources Provided: YES speech & physical therapy referrals & work note  Patient questions/concerns answered: NO. Pt denied questions/concerns  Patient verbalized understanding of treatment plan: YES

## 2019-06-13 NOTE — LETTER
NOTIFICATION RETURN TO WORK / SCHOOL 
 
6/13/2019 9:03 AM 
 
Ms. Kolby Eagle [address] [address] To Whom It May Concern: 
 
Kolby Eagle is currently under the care of [address] [place], [place] [address]Shanae Off work until follow-up 7/1/19. If there are questions or concerns please have the patient contact our office.  
 
 
 
Sincerely, 
 
 
Manny Baca, DO

## 2019-06-13 NOTE — PROGRESS NOTES
Pt reports that chiro received what was needed and has done 2 tx so far. Reports HA & nausea w/ HA. Also has head cold that has complicated. Reports feeling of somebody pulling her hair. Pt reports that when she was taking 37.5mg of Remeron she did not feel well at all.  Decreased to 30mg + melatonin and has been beneficial

## 2019-06-13 NOTE — PROGRESS NOTES
HISTORY OF PRESENT ILLNESS    Nguyễn Cedillo is a 51y.o. year old female that comes in today for follow-up: concussion    Since last appt Sx are improved with hyperbaric Tx where she could go to grocery store w/ only minimal issues. Has had 2 Tx so far and plan 3-5 visits for 10 total.  Has increased remeron to 37.5mg but too much so back to 30mg w/ melatonin and better. Still issues with speech (work finding) but improving and will stammer some. OT/PT and speech therapy going well and now split up so not back to back. Photophobia: much improved Phonophobia: much improved Sleep issues: no More emotional: some Dizziness: yes Nausea: yes LOC: no Trouble concentrating/foggy feeling: no    Social History     Socioeconomic History    Marital status: UNKNOWN     Spouse name: Not on file    Number of children: Not on file    Years of education: Not on file    Highest education level: Not on file   Tobacco Use    Smoking status: Never Smoker    Smokeless tobacco: Never Used   Substance and Sexual Activity    Alcohol use: Yes     Alcohol/week: 0.6 oz     Types: 1 Glasses of wine per week     Comment: rarely     Drug use: Never     Current Outpatient Medications   Medication Sig Dispense Refill    ondansetron hcl (ZOFRAN) 4 mg tablet Take 1 Tab by mouth every eight (8) hours as needed for Nausea. 21 Tab 0    mirtazapine (REMERON) 15 mg tablet Take 15-30mg at bed as needed (can combine with 7.5mg from prior). 30 Tab 2    acetaminophen (TYLENOL) 325 mg tablet Take  by mouth every four (4) hours as needed for Pain.  levothyroxine (SYNTHROID) 75 mcg tablet Take  by mouth Daily (before breakfast).  ibuprofen (MOTRIN IB) 200 mg tablet Take  by mouth.  zolpidem (AMBIEN) 5 mg tablet Take 5 mg by mouth.  memantine (NAMENDA) 5 mg tablet Take 1 Tab by mouth daily. May increase to 10mg after 5 days if needed. 30 Tab 1     History reviewed. No pertinent past medical history.   Family History   Problem Relation Age of Onset    Hypertension Mother     Cancer Mother          ROS:  All other systems reviewed and negative aside from that written in the HPI. Objective:  /78   Pulse 70   Temp 97.5 °F (36.4 °C)   Resp 15   Ht 5' 7\" (1.702 m)   Wt 152 lb 6.4 oz (69.1 kg)   BMI 23.87 kg/m²   GEN: Appears stated age in NAD. EYES: Conjunctiva and lids normal.  PERRL.  ENT: External ears and nose without lesions/trauma. Hearing Intact. Tongue midline. NECK: supple, non-tender and symmetrical.  Spurling negative  HEART: no peripheral edema  LUNGS: Normal effort  NEURO:  CN 2-12 grossly Intact. DTRs normal biceps, triceps, patellar and Achilles bilateral .  Sensation intact to light touch.  within normal limits rapid alternating movements. Romberg/pronator drift within normal limits. Tandem leg balance test (VINAY) 2 errors. Conjugate gaze to 6cm w/ Sx (very much improved). MS: Gait and Station normal.  no clubbing/cyanosis. Strength/tone normal throughout upper and lower extremities. SKIN: Warm/dry w/o rash. Assessment/Plan:     ICD-10-CM ICD-9-CM    1. Concussion without loss of consciousness, subsequent encounter S06.0X0D V58.89 REFERRAL TO SPEECH THERAPY     850.0 REFERRAL TO PHYSICAL THERAPY     Patient (or guardian if minor) verbalizes understanding of evaluation and plan. Discussed no need for Dr. Tosha Tristan as he is neuro and not neuropsych. Will continue current meds and renew speech and PT orders. Time with Pt 42 minutes, >50% of which was counseling pt regarding Dx and Tx options discussion of return to work, hyperbaric and coordination of care.

## 2019-07-01 ENCOUNTER — OFFICE VISIT (OUTPATIENT)
Dept: ORTHOPEDIC SURGERY | Age: 49
End: 2019-07-01

## 2019-07-01 VITALS
HEART RATE: 70 BPM | BODY MASS INDEX: 23.76 KG/M2 | WEIGHT: 151.4 LBS | SYSTOLIC BLOOD PRESSURE: 134 MMHG | TEMPERATURE: 97.9 F | HEIGHT: 67 IN | DIASTOLIC BLOOD PRESSURE: 83 MMHG | RESPIRATION RATE: 15 BRPM

## 2019-07-01 DIAGNOSIS — S06.0X0D CONCUSSION WITHOUT LOSS OF CONSCIOUSNESS, SUBSEQUENT ENCOUNTER: Primary | ICD-10-CM

## 2019-07-01 RX ORDER — CHOLECALCIFEROL (VITAMIN D3) 125 MCG
10 CAPSULE ORAL
COMMUNITY

## 2019-07-01 NOTE — PROGRESS NOTES
AVS reviewed: YES  HEP: N/A  Resources Provided: YES work note, referral for hyperbaric tx, & copies of office notes  Patient questions/concerns answered: NO. Pt denied questions/concerns  Patient verbalized understanding of treatment plan: YES

## 2019-07-01 NOTE — PATIENT INSTRUCTIONS
Continue to avoid aggravating activities, physical therapy and new order for hyperbaric treatment. Will consider fit for duty test as a means to get back to work.

## 2019-07-01 NOTE — LETTER
NOTIFICATION RETURN TO WORK / SCHOOL 
 
7/1/2019 9:20 AM 
 
Ms. Lurdes Daley [address] [place] [id number] To Whom It May Concern: 
 
Lurdes Daley is currently under the care of [address] [place] [address] Off work until follow-up 7/18/19. If there are questions or concerns please have the patient contact our office.  
 
 
 
Sincerely, 
 
 
Campbell Gomes, DO

## 2019-07-01 NOTE — PROGRESS NOTES
HISTORY OF PRESENT ILLNESS    Juanpablo Llanes is a 51y.o. year old female that comes in today for follow-up: concussion    Since last appt Sx are much improved with hyperbaric Tx x10. Last was 6/26/19 an dis hoping to do more as much improved and still some Sx here and there. Pain rated  4/10 top to front head and described as throbbing. Issues with balance and Sx with vacuuming noise/light and has to break it up into multiple small sessions. Also issues with balance and eye tracking but improving. Speech seemed worse so weaned remeron and totally done 5-6 days ago as sleep is great now that she is on hyperbaric. Will get checked 1-2 times and likely stop speech therapy. Still doing well with PT and is doing well with oculokinetic videos w/ siren noise to stimulate work. Photophobia: no Phonophobia: yes Sleep issues: no More emotional: some Dizziness: yes Nausea: yes LOC: no Trouble concentrating/foggy feeling: no    Social History     Socioeconomic History    Marital status: UNKNOWN     Spouse name: Not on file    Number of children: Not on file    Years of education: Not on file    Highest education level: Not on file   Tobacco Use    Smoking status: Never Smoker    Smokeless tobacco: Never Used   Substance and Sexual Activity    Alcohol use: Yes     Alcohol/week: 0.6 oz     Types: 1 Glasses of wine per week     Comment: rarely     Drug use: Never     Current Outpatient Medications   Medication Sig Dispense Refill    melatonin tab tablet Take 10 mg by mouth nightly.  ondansetron hcl (ZOFRAN) 4 mg tablet Take 1 Tab by mouth every eight (8) hours as needed for Nausea. 21 Tab 0    acetaminophen (TYLENOL) 325 mg tablet Take  by mouth every four (4) hours as needed for Pain.  levothyroxine (SYNTHROID) 75 mcg tablet Take  by mouth Daily (before breakfast).  ibuprofen (MOTRIN IB) 200 mg tablet Take  by mouth.       mirtazapine (REMERON) 15 mg tablet Take 15-30mg at bed as needed (can combine with 7.5mg from prior). 30 Tab 2    zolpidem (AMBIEN) 5 mg tablet Take 5 mg by mouth. History reviewed. No pertinent past medical history. Family History   Problem Relation Age of Onset    Hypertension Mother     Cancer Mother          ROS:  All other systems reviewed and negative aside from that written in the HPI. Objective:  /83   Pulse 70   Temp 97.9 °F (36.6 °C)   Resp 15   Ht 5' 7\" (1.702 m)   Wt 151 lb 6.4 oz (68.7 kg)   BMI 23.71 kg/m²   GEN: Appears stated age in NAD. EYES: Conjunctiva and lids normal.  PERRL.  ENT: External ears and nose without lesions/trauma.  Hearing Intact.  Tongue midline. NECK: supple, non-tender and symmetrical.  Spurling negative  HEART: no peripheral edema  LUNGS: Normal effort  NEURO:  CN 2-12 grossly Intact.  DTRs normal biceps, triceps, patellar and Achilles bilateral .  Sensation intact to light touch.  within normal limits rapid alternating movements.  Romberg/pronator drift within normal limits.  Tandem leg balance test (VINAY) 2 errors.  Conjugate gaze to 6cm w/ Sx (very much improved). MS: Gait and Station normal.  no clubbing/cyanosis.  Strength/tone normal throughout upper and lower extremities. SKIN: Warm/dry w/o rash. Assessment/Plan:     ICD-10-CM ICD-9-CM    1. Concussion without loss of consciousness, subsequent encounter S06.0X0D V58.89 HI HYPERBARIC OXYGEN THERAPY     850.0 CANCELED: IP CONSULT FOR HYPERBARIC CHAMBER      CANCELED: PT--HYPERBARIC OXYGEN THERAPY       Patient (or guardian if minor) verbalizes understanding of evaluation and plan. Will continue avoid aggravating activities and renew hyperbric and will continue PT and consider fit for duty test as a means to get back to work. RTC 7/18/19. Time with Pt 27 minutes, >50% of which was counseling pt regarding Dx and Tx options and coordination of care.

## 2019-07-11 ENCOUNTER — TELEPHONE (OUTPATIENT)
Dept: ORTHOPEDIC SURGERY | Age: 49
End: 2019-07-11

## 2019-07-11 DIAGNOSIS — S06.0X0D CONCUSSION WITHOUT LOSS OF CONSCIOUSNESS, SUBSEQUENT ENCOUNTER: Primary | ICD-10-CM

## 2019-07-11 NOTE — TELEPHONE ENCOUNTER
Contacted [facility] for clarification on the request.   They are needing an updated referral for physical therapy.

## 2019-07-11 NOTE — TELEPHONE ENCOUNTER
Shahriar from [address] [place] [address] is requesting an updated for for patient after 7/1 office eval.  Please fax to [place] [address] at [phone].

## 2019-07-15 ENCOUNTER — TELEPHONE (OUTPATIENT)
Dept: ORTHOPEDIC SURGERY | Age: 49
End: 2019-07-15

## 2019-07-15 NOTE — TELEPHONE ENCOUNTER
Called and left message with [employer] concerning what needs to be on order for at home hyperbaric tx. Their office is closed today; will make f/u call tomorrow.

## 2019-07-15 NOTE — TELEPHONE ENCOUNTER
Called Pt to inform her that Dr. Mile Valle is willing to write the order for in home hyperbaric tx, that we are waiting on a call back from Dr. Ricardo Grace office regarding specifics needed for order. Pt stated she believed it needs to states something to the affect of  \"in home therapy for 2 hours in the morning, 4 hours out, then 2 hours in the evening for 7 days/week. AT also confirmed with Pt that she should attend speech therapy tomorrow and continue per therapist's indication.

## 2019-07-15 NOTE — TELEPHONE ENCOUNTER
Pt called directly into the [place] office with the following concerns. - Pt reports HA, nausea & speech problems recently returned & lasted for 5 day. Pt states that she is feeling better today. She reports that having her family in town and the amount of conversation likely contributed to increase in symptoms. Pt sounds frustrated & upset. - Pt asks about attending speech therapy appt tomorrow. She did not previously think she needed it; Dr. Riky Lewis had said would likely be last per last office visit. But now she's unsure given her recurrence of sx. - Pt asks about getting an order for in home hyperbaric treatment. States that [facility] offers the service, but she will need order for it. Order should be sent to her  [facility] ([phone]). Informed Pt that AT would call her back with answers. Pt thanked AT.

## 2019-07-18 ENCOUNTER — OFFICE VISIT (OUTPATIENT)
Dept: ORTHOPEDIC SURGERY | Age: 49
End: 2019-07-18

## 2019-07-18 VITALS
HEIGHT: 67 IN | SYSTOLIC BLOOD PRESSURE: 147 MMHG | WEIGHT: 151.2 LBS | RESPIRATION RATE: 15 BRPM | BODY MASS INDEX: 23.73 KG/M2 | TEMPERATURE: 97.3 F | HEART RATE: 76 BPM | DIASTOLIC BLOOD PRESSURE: 84 MMHG

## 2019-07-18 DIAGNOSIS — S06.0X0D CONCUSSION WITHOUT LOSS OF CONSCIOUSNESS, SUBSEQUENT ENCOUNTER: Primary | ICD-10-CM

## 2019-07-18 NOTE — LETTER
7/18/2019 4:26 PM 
 
Ms. Cari Palmer [address] [place] [id number] To Whom it May Concern, 
 
Cari Palmer is currently under the care of [address] [place] [address]  
  
Off work until follow-up 8/15/19. 
  
If there are questions or concerns please have the patient contact our office.  
 
 
 
 
Sincerely, 
 
 
Do Guzman, DO

## 2019-07-18 NOTE — PATIENT INSTRUCTIONS
Continue to avoid aggravating activities, renew speech therapy and start home hyperbaric from Dr. Hillard Ganser.

## 2019-07-18 NOTE — PROGRESS NOTES
AVS reviewed: YES  HEP: N/A  Resources Provided: YES Work note, hyperbaric referrals, & speech therapy referral  Patient questions/concerns answered: NO. Pt denied questions/concerns  Patient verbalized understanding of treatment plan: YES

## 2019-07-18 NOTE — PROGRESS NOTES
HISTORY OF PRESENT ILLNESS    Benja Wu is a 51y.o. year old female that comes in today for follow-up: concussion    Since last appt Sx are worsened a shad family in from [facility] for 17 days but did pretty well the first week they were here. It has improved with hyperbaric Tx 19 total.  Pain rated  5/10 frontal/top head and described as throbbing/aching. Sx much improved now that family has left town 4 days ago. Dr. Lamin Morse hopes to get in home hyperbarics. Melatonin helps sleep a lot. Has issues with word-finding. Photophobia: no Phonophobia: yes Sleep issues: no More emotional: some Dizziness: yes Nausea: yes LOC: no Trouble concentrating/foggy feeling: no    Social History     Socioeconomic History    Marital status: UNKNOWN     Spouse name: Not on file    Number of children: Not on file    Years of education: Not on file    Highest education level: Not on file   Tobacco Use    Smoking status: Never Smoker    Smokeless tobacco: Never Used   Substance and Sexual Activity    Alcohol use: Yes     Alcohol/week: 1.0 standard drinks     Types: 1 Glasses of wine per week     Comment: rarely     Drug use: Never     Current Outpatient Medications   Medication Sig Dispense Refill    melatonin tab tablet Take 10 mg by mouth nightly.  ondansetron hcl (ZOFRAN) 4 mg tablet Take 1 Tab by mouth every eight (8) hours as needed for Nausea. 21 Tab 0    acetaminophen (TYLENOL) 325 mg tablet Take  by mouth every four (4) hours as needed for Pain.  levothyroxine (SYNTHROID) 75 mcg tablet Take  by mouth Daily (before breakfast).  ibuprofen (MOTRIN IB) 200 mg tablet Take  by mouth.  zolpidem (AMBIEN) 5 mg tablet Take 5 mg by mouth. History reviewed. No pertinent past medical history. Family History   Problem Relation Age of Onset    Hypertension Mother     Cancer Mother          ROS:  All other systems reviewed and negative aside from that written in the HPI.       Objective:  /84 Pulse 76   Temp 97.3 °F (36.3 °C)   Resp 15   Ht 5' 7\" (1.702 m)   Wt 151 lb 3.2 oz (68.6 kg)   BMI 23.68 kg/m²   GEN: Appears stated age in NAD. EYES: Conjunctiva and lids normal.  PERRL.  ENT: External ears and nose without lesions/trauma.  Hearing Intact.  Tongue midline. NECK: supple, non-tender and symmetrical.  Spurling negative  HEART: no peripheral edema  LUNGS: Normal effort  NEURO:  CN 2-12 grossly Intact.  DTRs normal biceps, triceps, patellar and Achilles bilateral .  Sensation intact to light touch.  within normal limits rapid alternating movements.  Romberg/pronator drift within normal limits.  Tandem leg balance test (VINAY) 2 errors.  Conjugate gaze to 7cm w/ Sx.  MS: Gait and Station normal.  no clubbing/cyanosis.  Strength/tone normal throughout upper and lower extremities. SKIN: Warm/dry w/o rash. Assessment/Plan:     ICD-10-CM ICD-9-CM    1. Concussion without loss of consciousness, subsequent encounter S06.0X0D V58.89 REFERRAL TO ALTERNATIVE MEDICINE     850.0 REFERRAL TO SPEECH THERAPY     Patient (or guardian if minor) verbalizes understanding of evaluation and plan. Will continue avoid aggravating activities renew speech therapy and will start home hyperbaric from Dr. De Paz Ruslan and return 3 weeks.

## 2019-07-18 NOTE — LETTER
7/18/2019 4:24 PM 
 
Ms. Hugo Morales [address] [place] [id number] To Whom it May Concern, 
 
[facility] 
  
Off work until follow-up 7/18/19. 
  
If there are questions or concerns please have the patient contact our office.  
 
 
 
 
Sincerely, 
 
 
Luz Handley, DO

## 2019-07-22 ENCOUNTER — TELEPHONE (OUTPATIENT)
Dept: ORTHOPEDIC SURGERY | Age: 49
End: 2019-07-22

## 2019-07-22 DIAGNOSIS — S06.0X0D CONCUSSION WITHOUT LOSS OF CONSCIOUSNESS, SUBSEQUENT ENCOUNTER: Primary | ICD-10-CM

## 2019-07-22 NOTE — TELEPHONE ENCOUNTER
Paxton Bro  [phone] states the insurance company if requesting an order for MRI of the head.     F/U 8/15/19  Last F/U 7/18/19

## 2019-07-22 NOTE — TELEPHONE ENCOUNTER
Called and informed Pj Monsivais that order has been placed. She stated that the insurance company will not authorize any further therapy w/o the MRI. She requests order be faxed to her. MRI order faxed to [facility] at [phone]. Fax confirmation received.

## 2019-07-23 ENCOUNTER — DOCUMENTATION ONLY (OUTPATIENT)
Dept: ORTHOPEDIC SURGERY | Age: 49
End: 2019-07-23

## 2019-07-23 NOTE — PROGRESS NOTES
MRI Brain without contrast has been submitted to [facility] for approval and scheduling,, [phone] ext [phone], fax [phone].

## 2019-07-24 ENCOUNTER — TELEPHONE (OUTPATIENT)
Dept: ORTHOPEDIC SURGERY | Age: 49
End: 2019-07-24

## 2019-07-24 NOTE — TELEPHONE ENCOUNTER
Patient is requesting a call back from providers  to discuss MRI and hyperbaric treatment - says it would be easier just to convey message over phone directly with . Patient was advised provider and staff are out of office today and message may not be answered until 7/25. Please contact patient to further discuss at [phone].

## 2019-07-25 NOTE — TELEPHONE ENCOUNTER
Called and spoke to Pt about her concerns. Pt wanted to schedule sooner f/u appt due to MRI on Mon 7/29. Pt was scheduled for 2pm appt 8/1/19.

## 2019-08-01 ENCOUNTER — OFFICE VISIT (OUTPATIENT)
Dept: ORTHOPEDIC SURGERY | Age: 49
End: 2019-08-01

## 2019-08-01 VITALS
BODY MASS INDEX: 23.79 KG/M2 | DIASTOLIC BLOOD PRESSURE: 70 MMHG | TEMPERATURE: 98 F | RESPIRATION RATE: 15 BRPM | SYSTOLIC BLOOD PRESSURE: 123 MMHG | HEIGHT: 67 IN | WEIGHT: 151.6 LBS | HEART RATE: 78 BPM

## 2019-08-01 DIAGNOSIS — S06.0X0D CONCUSSION WITHOUT LOSS OF CONSCIOUSNESS, SUBSEQUENT ENCOUNTER: ICD-10-CM

## 2019-08-01 DIAGNOSIS — S06.0X0D CONCUSSION WITHOUT LOSS OF CONSCIOUSNESS, SUBSEQUENT ENCOUNTER: Primary | ICD-10-CM

## 2019-08-01 RX ORDER — DIPHENHYDRAMINE HCL 25 MG
50 TABLET ORAL
COMMUNITY

## 2019-08-01 NOTE — PROGRESS NOTES
AVS reviewed: YES  HEP: N/A  Resources Provided: YES Hyperbaric tx order  Patient questions/concerns answered: NO. Pt denied questions/concerns  Patient verbalized understanding of treatment plan: YES

## 2019-08-01 NOTE — PROGRESS NOTES
HISTORY OF PRESENT ILLNESS    Julianne Messina is a 51y.o. year old female that comes in today for follow-up: concussion    Since last appt Sx are much improved since last appt. Had 4 more hyperbaric Tx ordered and insurance adjusted wanted MRI done which was normal 7/29/19. It has worsened with MRI and noise. Patient has tried:  Hyperbaric and completed 24 total and would not approve hyperbaric until after MRI done. Pain rated  8/10 front/top head and described as throb/ache but more rare. PT going well and at appt today hopeful to start transition and has been working on balance which is still improving. Doing speech as well and plan for at least 2 more visits. IMAGING: Photophobia: no Phonophobia: yes Sleep issues: no More emotional: some Dizziness: yes Nausea: yes LOC: no Trouble concentrating/foggy feeling: no    Social History     Socioeconomic History    Marital status: UNKNOWN     Spouse name: Not on file    Number of children: Not on file    Years of education: Not on file    Highest education level: Not on file   Tobacco Use    Smoking status: Never Smoker    Smokeless tobacco: Never Used   Substance and Sexual Activity    Alcohol use: Yes     Alcohol/week: 1.0 standard drinks     Types: 1 Glasses of wine per week     Comment: rarely     Drug use: Never     Current Outpatient Medications   Medication Sig Dispense Refill    diphenhydrAMINE (BENADRYL ALLERGY) 25 mg tablet Take 25 mg by mouth every six (6) hours as needed for Sleep.  melatonin tab tablet Take 10 mg by mouth nightly.  ondansetron hcl (ZOFRAN) 4 mg tablet Take 1 Tab by mouth every eight (8) hours as needed for Nausea. 21 Tab 0    acetaminophen (TYLENOL) 325 mg tablet Take  by mouth every four (4) hours as needed for Pain.  levothyroxine (SYNTHROID) 75 mcg tablet Take  by mouth Daily (before breakfast).  ibuprofen (MOTRIN IB) 200 mg tablet Take  by mouth.  zolpidem (AMBIEN) 5 mg tablet Take 5 mg by mouth. History reviewed. No pertinent past medical history. Family History   Problem Relation Age of Onset    Hypertension Mother     Cancer Mother          ROS:  All other systems reviewed and negative aside from that written in the HPI. Objective:  /70   Pulse 78   Temp 98 °F (36.7 °C)   Resp 15   Ht 5' 7\" (1.702 m)   Wt 151 lb 9.6 oz (68.8 kg)   BMI 23.74 kg/m²   GEN: Appears stated age in NAD. EYES: Conjunctiva and lids normal.  PERRL.  ENT: External ears and nose without lesions/trauma. Hearing Intact. Tongue midline. NECK: supple, non-tender and symmetrical.  Spurling negative  HEART: no peripheral edema  LUNGS: Normal effort  NEURO:  CN 2-12 grossly Intact. DTRs normal biceps, triceps, patellar and Achilles bilateral .  Sensation intact to light touch.  within normal limits rapid alternating movements. Romberg/pronator drift within normal limits. Tandem leg balance test (VINAY) negative. Conjugate gaze to 6cm. MS: Gait and Station normal.  no clubbing/cyanosis. Strength/tone normal throughout upper and lower extremities. SKIN: Warm/dry w/o rash. HEENT: Conjunctiva/lids WNL. External canals/nares WNL. Tongue midline. PERRL, EOMI. Hearing intact. NECK: Trachea midline. Supple, Full ROM. No thyromegaly. CARDIAC: No edema. LUNGS: Normal effort. ABD: Soft, no masses. No HSM. PSYCH: A+O x3. Appropriate judgment and insight. Assessment/Plan:     ICD-10-CM ICD-9-CM    1. Concussion without loss of consciousness, subsequent encounter S06.0X0D V58.89 REFERRAL TO ALTERNATIVE MEDICINE     850.0        Patient (or guardian if minor) verbalizes understanding of evaluation and plan. Will continue avoid aggravating activities and will continue melatonin/benadryl for sleep and re-order at-home hyperbaric and RTC 2 weeks. Time with Pt 29 minutes, >50% of which was counseling pt regarding Dx and Tx options and coordination of care.

## 2019-08-01 NOTE — PATIENT INSTRUCTIONS
Continue to avoid aggravating activities and melatonin/benadryl for sleep. At home hyperbaric treatment re-ordered.

## 2019-08-15 ENCOUNTER — OFFICE VISIT (OUTPATIENT)
Dept: ORTHOPEDIC SURGERY | Age: 49
End: 2019-08-15

## 2019-08-15 VITALS
WEIGHT: 152 LBS | SYSTOLIC BLOOD PRESSURE: 118 MMHG | HEART RATE: 78 BPM | TEMPERATURE: 96.8 F | DIASTOLIC BLOOD PRESSURE: 68 MMHG | BODY MASS INDEX: 23.86 KG/M2 | RESPIRATION RATE: 15 BRPM | HEIGHT: 67 IN

## 2019-08-15 DIAGNOSIS — S06.0X0D CONCUSSION WITHOUT LOSS OF CONSCIOUSNESS, SUBSEQUENT ENCOUNTER: Primary | ICD-10-CM

## 2019-08-15 NOTE — PROGRESS NOTES
HISTORY OF PRESENT ILLNESS    Katie Bradley is a 51y.o. year old female that comes in today for follow-up: concussion    Since last appt Sx are improved with hyperbaric and started in home this week. Pain rated  0 - No pain/10 but will get bad \"hair pulling\" pressure on top head. Sister came and visited w/ 2 nephews and w/in 1 hour was having more Sx. Still doing speech but PT just checking in. Still issues with word-finding and conversation very difficult. Photophobia: yes if flashing/strobe Phonophobia: yes (more amplified) Sleep issues: yes despite melatonin and benadryl (is having hot flashes). More emotional: yes Dizziness: some balance issues falling back and to right Nausea: yes but zofran helps LOC: no Trouble concentrating/foggy feeling: no    IMAGING: MRI brain normal 7/29/19 per report    Social History     Socioeconomic History    Marital status: UNKNOWN     Spouse name: Not on file    Number of children: Not on file    Years of education: Not on file    Highest education level: Not on file   Tobacco Use    Smoking status: Never Smoker    Smokeless tobacco: Never Used   Substance and Sexual Activity    Alcohol use: Yes     Alcohol/week: 1.0 standard drinks     Types: 1 Glasses of wine per week     Comment: rarely     Drug use: Never     Current Outpatient Medications   Medication Sig Dispense Refill    diphenhydrAMINE (BENADRYL ALLERGY) 25 mg tablet Take 50 mg by mouth every six (6) hours as needed for Sleep.  melatonin tab tablet Take 10 mg by mouth nightly.  ondansetron hcl (ZOFRAN) 4 mg tablet Take 1 Tab by mouth every eight (8) hours as needed for Nausea. 21 Tab 0    acetaminophen (TYLENOL) 325 mg tablet Take  by mouth every four (4) hours as needed for Pain.  levothyroxine (SYNTHROID) 75 mcg tablet Take  by mouth Daily (before breakfast).  ibuprofen (MOTRIN IB) 200 mg tablet Take  by mouth.  zolpidem (AMBIEN) 5 mg tablet Take 5 mg by mouth. History reviewed. No pertinent past medical history. Family History   Problem Relation Age of Onset    Hypertension Mother     Cancer Mother          ROS:  All other systems reviewed and negative aside from that written in the HPI. Objective:  /68   Pulse 78   Temp 96.8 °F (36 °C)   Resp 15   Ht 5' 7\" (1.702 m)   Wt 152 lb (68.9 kg)   BMI 23.81 kg/m²   GEN: Appears stated age in NAD. EYES: Conjunctiva and lids normal.  PERRL.  ENT: External ears and nose without lesions/trauma. Hearing Intact. Tongue midline. NECK: supple, non-tender and symmetrical.  Spurling negative  HEART: no peripheral edema  LUNGS: Normal effort  NEURO:  CN 2-12 grossly Intact. DTRs normal biceps, triceps, patellar and Achilles bilateral .  Sensation intact to light touch.  within normal limits rapid alternating movements. Romberg/pronator drift within normal limits. Tandem leg balance test (VINAY) negative. Conjugate gaze to 6cm. MS: Gait and Station normal.  no clubbing/cyanosis. Strength/tone normal throughout upper and lower extremities. SKIN: Warm/dry w/o rash. HEENT: Conjunctiva/lids WNL. External canals/nares WNL. Tongue midline. PERRL, EOMI. Hearing intact. NECK: Trachea midline. Supple, Full ROM. No thyromegaly. CARDIAC: No edema. LUNGS: Normal effort. ABD: Soft, no masses. No HSM. PSYCH: A+O x3. Appropriate judgment and insight. Assessment/Plan:     ICD-10-CM ICD-9-CM    1. Concussion without loss of consciousness, subsequent encounter S06.0X0D V58.89 REFERRAL TO SPEECH THERAPY     850.0      Patient (or guardian if minor) verbalizes understanding of evaluation and plan. Will continue avoid aggravating activities and continue home hyperbaric. Will renew speech referral and RTC 3-4 weeks. Off work until then. Time with Pt 26 minutes, >50% of which was counseling pt regarding Dx and Tx options and coordination of care.

## 2019-08-15 NOTE — LETTER
8/15/2019 11:31 AM 
 
Ms. Girard Sites [address] [facility] [address] [address] Off work until follow-up with me 8/12/19.  
 
 
 
Sincerely, 
 
 
Kimberly Talbot, DO

## 2019-08-15 NOTE — PROGRESS NOTES
No HA at time of appt, but HA 4-5/10 daily. Has used medication, but more so has been resting. Still some nausea & balance issues; less so. Speech problems still present. Had sister in town and was too overwhelming and straining; had to remove herself from conversations. HA described as pressure, hair pulling, or brick on head. Pt reports flashing lights being an issue; especially while watching tv. Has received the in house hyperbaric chamber.

## 2019-08-15 NOTE — PROGRESS NOTES
AVS reviewed: YES  HEP: N/A  Resources Provided: YES speech therapy referral & work note  Patient questions/concerns answered: NO. Pt denied questions/concerns  Patient verbalized understanding of treatment plan: YES

## 2019-09-12 ENCOUNTER — OFFICE VISIT (OUTPATIENT)
Dept: ORTHOPEDIC SURGERY | Age: 49
End: 2019-09-12

## 2019-09-12 VITALS
WEIGHT: 154 LBS | DIASTOLIC BLOOD PRESSURE: 79 MMHG | HEIGHT: 67 IN | HEART RATE: 71 BPM | BODY MASS INDEX: 24.17 KG/M2 | SYSTOLIC BLOOD PRESSURE: 132 MMHG | TEMPERATURE: 97.9 F | RESPIRATION RATE: 15 BRPM

## 2019-09-12 DIAGNOSIS — S06.0X0D CONCUSSION WITHOUT LOSS OF CONSCIOUSNESS, SUBSEQUENT ENCOUNTER: Primary | ICD-10-CM

## 2019-09-12 RX ORDER — CHOLECALCIFEROL (VITAMIN D3) 125 MCG
100 CAPSULE ORAL DAILY
COMMUNITY

## 2019-09-12 RX ORDER — GLUCOSAM/CHONDRO/HERB 149/HYAL 750-100 MG
1 TABLET ORAL DAILY
COMMUNITY

## 2019-09-12 NOTE — PROGRESS NOTES
AVS reviewed: YES  HEP: N/A  Resources Provided: YES Referral to hyperbaric, referral to neuropsych, work letter & letter in support of hyperbaric. Provided copies of everything, including office note to   Patient questions/concerns answered: YES.  Provided info on neuropsych eval  Patient verbalized understanding of treatment plan: YES
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
## 2019-09-12 NOTE — LETTER
9/12/2019 9:50 AM 
 
Ms. Randolph Go [address] [place] [id number] To Whom It May Concern: 
  
Randolph Go is currently under the care of [address] [place] [address]. 
  
She will return to work/school on: 9/16/19. 
  
She can work up to 2 hours a day as tolerated. Will need to minimize computer use, noise and bright light or else will develop significant symptoms. 
  
We will increase her time and exertion as tolerated. 
  
If there are questions or concerns please have the patient contact our office.  
 
 
 
Sincerely, 
 
 
Sravan Stuart, DO

## 2019-09-12 NOTE — PATIENT INSTRUCTIONS
Trial up to 2 hours of work; if symptoms/issues arise with <2 hours of work, reduce work. Continue with speech therapy and try to renew at home hyperbaric. Start Omega 3 and one week later add CoQ-10 with consideration of herbals after that; to be added only one at a time.

## 2019-10-03 ENCOUNTER — TELEPHONE (OUTPATIENT)
Dept: ORTHOPEDIC SURGERY | Age: 49
End: 2019-10-03

## 2019-10-03 DIAGNOSIS — S06.0X0D CONCUSSION WITHOUT LOSS OF CONSCIOUSNESS, SUBSEQUENT ENCOUNTER: Primary | ICD-10-CM

## 2019-10-03 NOTE — TELEPHONE ENCOUNTER
Patient called to request an updated order for speech therapy be faxed over to [facility]. Last order was 8/15 and patient thought she was to continue, however, no order was written on 9/12 when she was last seen. Spoke to Accolo at [facility] and advised that it's W/C, but since patient was in office, he was hoping we could fax over updated order and they will forward to their insurance verification team so patient could be seen today. Please fax to them at [phone] and also forward to Kelsey sorenson at [phone].

## 2019-10-03 NOTE — TELEPHONE ENCOUNTER
Contacted patient at her home number. Informed her that order for therapy was sent to [facility] and  at this time. Pt thanked writer for call and information.

## 2019-10-10 ENCOUNTER — TELEPHONE (OUTPATIENT)
Dept: ORTHOPEDIC SURGERY | Age: 49
End: 2019-10-10

## 2019-10-10 NOTE — TELEPHONE ENCOUNTER
Patient went [facility] office. 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 Patient is aware Dr. Yen Fernández out of office.  Please all patient back when letter is ready at [phone]

## 2019-10-10 NOTE — TELEPHONE ENCOUNTER
Called & LVM on Pt identified VM informing her that letter request has been sent to Dr. Marco Antonio Stanley for his review and we'll call with update when available.

## 2019-10-11 NOTE — TELEPHONE ENCOUNTER
Called and informed Pt that Dr. Abi Adame approved new work note request & that letter is available through [facility]. Pt stated that she does not have myChart, but that the woman she spoke to yesterday at the [place] office said she would be able to print it for Pt if it's in the computer. Pt thanked caller.

## 2019-10-24 ENCOUNTER — OFFICE VISIT (OUTPATIENT)
Dept: ORTHOPEDIC SURGERY | Age: 49
End: 2019-10-24

## 2019-10-24 VITALS
TEMPERATURE: 97.7 F | WEIGHT: 155 LBS | DIASTOLIC BLOOD PRESSURE: 79 MMHG | BODY MASS INDEX: 24.33 KG/M2 | RESPIRATION RATE: 15 BRPM | SYSTOLIC BLOOD PRESSURE: 148 MMHG | HEIGHT: 67 IN | HEART RATE: 72 BPM

## 2019-10-24 DIAGNOSIS — S06.0X0D CONCUSSION WITHOUT LOSS OF CONSCIOUSNESS, SUBSEQUENT ENCOUNTER: Primary | ICD-10-CM
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
## 2019-10-24 RX ORDER — GLUCOSAMINE SULFATE 1500 MG
POWDER IN PACKET (EA) ORAL DAILY
COMMUNITY

## 2019-10-24 RX ORDER — BISMUTH SUBSALICYLATE 262 MG
1 TABLET,CHEWABLE ORAL DAILY
COMMUNITY

## 2019-10-24 RX ORDER — TRAZODONE HYDROCHLORIDE 50 MG/1
50-100 TABLET ORAL
Qty: 60 TAB | Refills: 0 | Status: SHIPPED | OUTPATIENT
Start: 2019-10-24 | End: 2019-11-26 | Stop reason: SDUPTHER

## 2019-10-24 NOTE — PROGRESS NOTES
HISTORY OF PRESENT ILLNESS    Sydnie Lujan is a 51y.o. 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 It has improved with PT, speech, hyperbaric (ended last week at home Tx) total Tx 102 Tx. Pain rated  3/10 top head and described as dull ache, pressure/annoyance. Did have neuropsych test done 4 days ago but no appt for results yet. Finished speech last week and no PT in several weeks. Had been Tx ambien for sleep for years PRN prior to this. Also Hashimotos for several years and using synthroid    Photophobia: yes Phonophobia: yes Sleep issues: yes having issues with benadryl and melatonin (also hot flashes)  More emotional: improved Dizziness: improved as is balance Nausea: some LOC: no Trouble concentrating/foggy feeling: yes    IMAGING: MRI brain normal 7/29/19 per report    Social History     Socioeconomic History    Marital status: UNKNOWN     Spouse name: Not on file    Number of children: Not on file    Years of education: Not on file    Highest education level: Not on file   Tobacco Use    Smoking status: Never Smoker    Smokeless tobacco: Never Used   Substance and Sexual Activity    Alcohol use: Yes     Alcohol/week: 1.0 standard drinks     Types: 1 Glasses of wine per week     Comment: rarely     Drug use: Never     Current Outpatient Medications   Medication Sig Dispense Refill    multivitamin (ONE A DAY) tablet Take 1 Tab by mouth daily.  cholecalciferol (VITAMIN D3) 1,000 unit cap Take  by mouth daily.  omega 3-DHA-EPA-fish oil 1,000 mg (120 mg-180 mg) capsule Take 1 Cap by mouth daily.  co-enzyme Q-10 (CO Q-10) 100 mg capsule Take 100 mg by mouth daily.  diphenhydrAMINE (BENADRYL ALLERGY) 25 mg tablet Take 50 mg by mouth every six (6) hours as needed for Sleep.  melatonin tab tablet Take 20 mg by mouth nightly.       ondansetron hcl (ZOFRAN) 4 mg tablet Take 1 Tab by mouth every eight (8) hours as needed for Nausea. 21 Tab 0    acetaminophen (TYLENOL) 325 mg tablet Take  by mouth every four (4) hours as needed for Pain.  levothyroxine (SYNTHROID) 75 mcg tablet Take 88 mcg by mouth Daily (before breakfast).  ibuprofen (MOTRIN IB) 200 mg tablet Take  by mouth.  zolpidem (AMBIEN) 5 mg tablet Take 5 mg by mouth. Past Medical History:   Diagnosis Date    Hashimoto's disease      Family History   Problem Relation Age of Onset    Hypertension Mother     Cancer Mother          ROS:  All other systems reviewed and negative aside from that written in the HPI. Objective:  /79   Pulse 72   Temp 97.7 °F (36.5 °C)   Resp 15   Ht 5' 7\" (1.702 m)   Wt 155 lb (70.3 kg)   BMI 24.28 kg/m²   GEN: Appears stated age in NAD. EYES: Conjunctiva and lids normal.  PERRL.  ENT: External ears and nose without lesions/trauma.  Hearing Intact.  Tongue midline. NECK: supple, non-tender and symmetrical.  Spurling negative  HEART: no peripheral edema  LUNGS: Normal effort  NEURO:  CN 2-12 grossly Intact.  DTRs normal biceps, triceps, patellar and Achilles bilateral .  Sensation intact to light touch.  within normal limits rapid alternating movements.  Romberg/pronator drift within normal limits.  Tandem leg balance test (VINAY) 1 error.  Conjugate gaze to 6cm. MS: Gait and Station normal.  no clubbing/cyanosis.  Strength/tone normal throughout upper and lower extremities. SKIN: Warm/dry w/o rash. HEENT: Conjunctiva/lids WNL. External canals/nares WNL. Tongue midline. PERRL, EOMI. Hearing intact. NECK: Trachea midline. Supple, Full ROM. No thyromegaly. CARDIAC: No edema. LUNGS: Normal effort. ABD: Soft, no masses. No HSM. PSYCH: A+O x3. Appropriate judgment and insight. Assessment/Plan:     ICD-10-CM ICD-9-CM    1.  Concussion without loss of consciousness, subsequent encounter S06.0X0D V58.89 traZODone (DESYREL) 50 mg tablet     850.0    2. Work related injury Y99.0 959.9      Patient (or guardian if minor) verbalizes understanding of evaluation and plan. Will continue avoid aggravating activities and continue current Tx and start trazodone slow titrate and use ambien PRN if trazodone not adeqaute for sleep w/ melatonin as back up. Return 4 weeks. Letter to increase work to up to 4 days a week and up to 6 hours a day if tolerated. Will await neuropsych report and I will notify results.

## 2019-10-24 NOTE — LETTER
NOTIFICATION RETURN TO WORK / SCHOOL 
 
10/24/2019 11:45 AM 
 
Ms. Catrachita Newsome [address] [address] To Whom It May Concern: 
 
Catrachita Newsome is currently under the care of [address] [place] [address]. She may work for up to 6 hours per day and no more than 4 days per week, as tolerated. 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 If there are questions or concerns please have the patient contact our office.  
 
 
 
Sincerely, 
 
 
Dm Agustin, DO

## 2019-10-24 NOTE — PATIENT INSTRUCTIONS
Continue to avoid aggravating activities, use medications as prescribed, increase work participation and return to office in 4 weeks.

## 2019-10-24 NOTE — PROGRESS NOTES
AVS reviewed: YES  HEP: N/A  Resources Provided: YES work note  Patient questions/concerns answered: YES. Confirmed Pt should contact our office if she believes she can increase work participation before f/u.   Patient verbalized understanding of treatment plan: YES

## 2019-11-21 ENCOUNTER — OFFICE VISIT (OUTPATIENT)
Dept: ORTHOPEDIC SURGERY | Age: 49
End: 2019-11-21

## 2019-11-21 VITALS
DIASTOLIC BLOOD PRESSURE: 77 MMHG | TEMPERATURE: 98 F | HEIGHT: 67 IN | RESPIRATION RATE: 15 BRPM | WEIGHT: 154.4 LBS | BODY MASS INDEX: 24.23 KG/M2 | SYSTOLIC BLOOD PRESSURE: 134 MMHG | HEART RATE: 66 BPM

## 2019-11-21 DIAGNOSIS — S06.0X0D CONCUSSION WITHOUT LOSS OF CONSCIOUSNESS, SUBSEQUENT ENCOUNTER: Primary | ICD-10-CM
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
## 2019-11-21 NOTE — PROGRESS NOTES
HISTORY OF PRESENT ILLNESS    Donna Alexandra is a 51y.o. 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 Is able to work 7 hours. It has improved with speech, PT, speech, hyperbaric (ended 5 weeks ago - total 102 Tx between in office and at home). Pain rated  2/10 top head and described as pressure. Has been using trazodone 50-100mg w/ benefit and ambien on top if needed pretty much nightly and will take 1/2 tab then. 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 Neuropsych done 10/21/19 with report 11/8/19 Dr. Elan Lechuga (report scanned). Chiro for neck helping. Photophobia: only certain situations Phonophobia: mild Sleep issues: yes having issues with benadryl and melatonin (also hot flashes)  More emotional: improved Dizziness: improved as is balance Nausea: minimal LOC: no Trouble concentrating/foggy feeling: improved    IMAGING: MRI brain normal 7/29/19 per report    Social History     Socioeconomic History    Marital status: UNKNOWN     Spouse name: Not on file    Number of children: Not on file    Years of education: Not on file    Highest education level: Not on file   Tobacco Use    Smoking status: Never Smoker    Smokeless tobacco: Never Used   Substance and Sexual Activity    Alcohol use: Yes     Alcohol/week: 1.0 standard drinks     Types: 1 Glasses of wine per week     Comment: rarely     Drug use: Never     Current Outpatient Medications   Medication Sig Dispense Refill    fish oil-omega-3 fatty acids 300-500 mg cap Take  by mouth.  multivitamin (ONE A DAY) tablet Take 1 Tab by mouth daily.  cholecalciferol (VITAMIN D3) 1,000 unit cap Take  by mouth daily.  traZODone (DESYREL) 50 mg tablet Take 1-2 Tabs by mouth nightly.  (Patient taking differently: Take 100 mg by mouth nightly.) 60 Tab 0    omega 3-DHA-EPA-fish oil 1,000 mg (120 mg-180 mg) capsule Take 1 Cap by mouth daily.  co-enzyme Q-10 (CO Q-10) 100 mg capsule Take 100 mg by mouth daily.  diphenhydrAMINE (BENADRYL ALLERGY) 25 mg tablet Take 50 mg by mouth every six (6) hours as needed for Sleep.  melatonin tab tablet Take 20 mg by mouth nightly.  ondansetron hcl (ZOFRAN) 4 mg tablet Take 1 Tab by mouth every eight (8) hours as needed for Nausea. 21 Tab 0    zolpidem (AMBIEN) 5 mg tablet Take 5 mg by mouth.  acetaminophen (TYLENOL) 325 mg tablet Take  by mouth every four (4) hours as needed for Pain.  levothyroxine (SYNTHROID) 75 mcg tablet Take 100 mcg by mouth Daily (before breakfast).  ibuprofen (MOTRIN IB) 200 mg tablet Take  by mouth. Past Medical History:   Diagnosis Date    Hashimoto's disease      Family History   Problem Relation Age of Onset    Hypertension Mother     Cancer Mother        ROS:  All other systems reviewed and negative aside from that written in the HPI. Objective:  /77   Pulse 66   Temp 98 °F (36.7 °C)   Resp 15   Ht 5' 7\" (1.702 m)   Wt 154 lb 6.4 oz (70 kg)   BMI 24.18 kg/m²   GEN: Appears stated age in NAD. EYES: Conjunctiva and lids normal.  PERRL.  ENT: External ears and nose without lesions/trauma.  Hearing Intact.  Tongue midline. NECK: supple, non-tender and symmetrical.  Spurling negative  HEART: no peripheral edema  LUNGS: Normal effort  NEURO:  CN 2-12 grossly Intact.  DTRs normal biceps, triceps, patellar and Achilles bilateral .  Sensation intact to light touch.  within normal limits rapid alternating movements.  Romberg/pronator drift within normal limits.  Tandem leg balance test (VINAY) 1 error.  Conjugate gaze to 6cm. MS: Gait and Station normal.  no clubbing/cyanosis.  Strength/tone normal throughout upper and lower extremities. SKIN: Warm/dry w/o rash. HEENT: Conjunctiva/lids WNL.  External canals/nares WNL. Tongue midline. PERRL, EOMI. Hearing intact. NECK: Trachea midline. Supple, Full ROM. No thyromegaly. CARDIAC: No edema. LUNGS: Normal effort. ABD: Soft, no masses. No HSM. PSYCH: A+O x3. Appropriate judgment and insight. Assessment/Plan:     ICD-10-CM ICD-9-CM    1. Concussion without loss of consciousness, subsequent encounter S06.0X0D V58.89      850.0    2. Work related injury Y99.0 959.9        Patient (or guardian if minor) verbalizes understanding of evaluation and plan. 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 Continue trazodone w/ ambien PRN and RTC 4 weeks. Rosemarie advised chiro so will do periodically. Will call if needs zofran refilled. Time with Pt 44 minutes, >50% of which was counseling pt regarding Dx and Tx options and coordination of care.

## 2019-11-21 NOTE — PATIENT INSTRUCTIONS
Continue with medication as prescribed and call if Zofran refill needed; continue with increased work participation, and chiropractor and return to the office in about 4 weeks.

## 2019-11-21 NOTE — PROGRESS NOTES
Pt states that has been doing better. Usually okay until has onset of symptoms doing new things or more things. Will get HA, but then subsides. Still experiences nausea sometimes.

## 2019-11-21 NOTE — PROGRESS NOTES
AVS reviewed: YES  HEP: N/A  Resources Provided: YES work letter. Copies provided to .   Patient questions/concerns answered: NO. Pt denied questions/concerns  Patient verbalized understanding of treatment plan: YES

## 2019-11-21 NOTE — LETTER
NOTIFICATION RETURN TO WORK / SCHOOL 
 
11/21/2019 8:29 AM 
 
Ms. Lorene Rico [address] [place] [address] [place] [address] To Whom It May Concern: 
 
Lorene Rico is currently under the care of [address] [place] [address]. She will return to work/school on: 11/21/19 She may work for up to 8 hours per day and no more than 3 days per week, as tolerated. Can increase to 10 hours after 2 weeks if tolerated. During work hours, she may provide patient care accompanied. If there are questions or concerns please have the patient contact our office.  
 
 
 
Sincerely, 
 
 
Joe Watreman, DO

## 2019-11-26 DIAGNOSIS — S06.0X0A CONCUSSION WITHOUT LOSS OF CONSCIOUSNESS, INITIAL ENCOUNTER: ICD-10-CM

## 2019-11-26 DIAGNOSIS — S06.0X0D CONCUSSION WITHOUT LOSS OF CONSCIOUSNESS, SUBSEQUENT ENCOUNTER: ICD-10-CM

## 2019-11-26 NOTE — TELEPHONE ENCOUNTER
Last Visit: 11/21/19 with MD Shima Estrella  Next Appointment: 12/19/19 with MD Shima Estrella  Previous Refill Encounter(s): 10/24/19 #60    Requested Prescriptions     Pending Prescriptions Disp Refills    traZODone (DESYREL) 50 mg tablet [Pharmacy Med Name: TRAZODONE 50MG TABLETS] 60 Tab 0     Sig: Take 1-2 Tabs by mouth nightly.

## 2019-12-02 ENCOUNTER — TELEPHONE (OUTPATIENT)
Dept: ORTHOPEDIC SURGERY | Age: 49
End: 2019-12-02

## 2019-12-02 RX ORDER — ONDANSETRON 4 MG/1
4 TABLET, FILM COATED ORAL
Qty: 21 TAB | Refills: 0 | Status: SHIPPED | OUTPATIENT
Start: 2019-12-02 | End: 2020-08-10 | Stop reason: SDUPTHER

## 2019-12-02 RX ORDER — TRAZODONE HYDROCHLORIDE 50 MG/1
100 TABLET ORAL
Qty: 60 TAB | Refills: 0 | Status: SHIPPED | OUTPATIENT
Start: 2019-12-02 | End: 2019-12-19 | Stop reason: SDUPTHER
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
## 2019-12-02 NOTE — TELEPHONE ENCOUNTER
Received call from Pt stating she received call from pharmacy stating that the Zofran requires a prior authorization. Informed Pt that our office will complete that once it is received. Pt thanked caller.

## 2019-12-02 NOTE — TELEPHONE ENCOUNTER
Zofran should be billed to worker's comp. Pharmacy called and was informed of this. They received a paid claim.  No further action needed

## 2019-12-19 ENCOUNTER — OFFICE VISIT (OUTPATIENT)
Dept: ORTHOPEDIC SURGERY | Age: 49
End: 2019-12-19

## 2019-12-19 VITALS
WEIGHT: 154 LBS | HEART RATE: 82 BPM | HEIGHT: 67 IN | TEMPERATURE: 98.1 F | DIASTOLIC BLOOD PRESSURE: 82 MMHG | SYSTOLIC BLOOD PRESSURE: 132 MMHG | BODY MASS INDEX: 24.17 KG/M2 | RESPIRATION RATE: 15 BRPM

## 2019-12-19 DIAGNOSIS — S06.0X0D CONCUSSION WITHOUT LOSS OF CONSCIOUSNESS, SUBSEQUENT ENCOUNTER: Primary | ICD-10-CM
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
## 2019-12-19 RX ORDER — TRAZODONE HYDROCHLORIDE 50 MG/1
100 TABLET ORAL
Qty: 60 TAB | Refills: 0 | Status: SHIPPED | OUTPATIENT
Start: 2019-12-19 | End: 2020-01-21

## 2019-12-19 NOTE — PATIENT INSTRUCTIONS
Continue with care plan and work as dictated. Continue with Ambien as needed and contact our office if Zofran refill needed. Follow-up on referral to Dr. Melanie Anna for chiropractic care. Return to the office in about 4 weeks.

## 2019-12-19 NOTE — LETTER
NOTIFICATION RETURN TO WORK / SCHOOL 
 
12/19/2019 9:17 AM 
 
Ms. Enmanuel Fernandes [address] [place] [address] To Whom It May Concern: 
 
Enmanuel Fernandes is currently under the care of [address] [place] [address]. She will return to work/school on: 1/16/2020 She may work for up to 8 hours per day and no more than 3 days per week, as tolerated. Can increase to 10 hours after 2 weeks if tolerated. During work hours, she may provide patient care accompanied. If there are questions or concerns please have the patient contact our office.  
 
 
 
Sincerely, 
 
 
Dante Carr, DO

## 2019-12-19 NOTE — PROGRESS NOTES
AVS reviewed: YES  HEP: N/A  Resources Provided: YES work letter & referral to [facility]  Patient questions/concerns answered: YES. Pt requests refill on Trazadone as she'll run out prior to f/u visit.   Patient verbalized understanding of treatment plan: YES

## 2019-12-19 NOTE — PROGRESS NOTES
HISTORY OF PRESENT ILLNESS    Nadia De Leon is a 47y.o. year old female that comes in today for follow-up: concussion    Since last appt Sx are still slowly improving   Did well with [employer] until about half way through. Also had hard time with [employer] small house with many people/conversations. Also may be due to Trazodone stopping prior to that. Pain rated  4/10 top head and described as pressure but off and on. Did go watch  play basketball at [facility] and a lot of issues with lights and whistles needed recovery for a couple days after and much better after manip Dr. Shirley Villegas 6 total and definitely better after. Has worked up to 9 hours thus far but then has to leave d/t HA. 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 Did feel a bit slow but did do well. Did have trouble with computer charting and pop-up screen while ambulance moving. Neuropsych done 10/21/19 with report 11/8/19 Dr. Constance Landon (report scanned).     Photophobia: only certain situations Phonophobia: mild Sleep issues: yes having issues with benadryl and melatonin (also hot flashes)  More emotional: improved Dizziness: improved as is balance Nausea: minimal LOC: no Trouble concentrating/foggy feeling: improved     IMAGING: MRI brain normal 7/29/19 per report    Social History     Socioeconomic History    Marital status: UNKNOWN     Spouse name: Not on file    Number of children: Not on file    Years of education: Not on file    Highest education level: Not on file   Tobacco Use    Smoking status: Never Smoker    Smokeless tobacco: Never Used   Substance and Sexual Activity    Alcohol use: Yes     Alcohol/week: 1.0 standard drinks     Types: 1 Glasses of wine per week     Comment: rarely     Drug use: Never     Current Outpatient Medications   Medication Sig Dispense Refill    traZODone (DESYREL) 50 mg tablet Take 2 Tabs by mouth nightly.  (Patient taking differently: Take 75 mg by mouth nightly.) 60 Tab 0    ondansetron hcl (ZOFRAN) 4 mg tablet Take 1 Tab by mouth every eight (8) hours as needed for Nausea. 21 Tab 0    fish oil-omega-3 fatty acids 300-500 mg cap Take  by mouth.  multivitamin (ONE A DAY) tablet Take 1 Tab by mouth daily.  cholecalciferol (VITAMIN D3) 1,000 unit cap Take  by mouth daily.  omega 3-DHA-EPA-fish oil 1,000 mg (120 mg-180 mg) capsule Take 1 Cap by mouth daily.  co-enzyme Q-10 (CO Q-10) 100 mg capsule Take 100 mg by mouth daily.  diphenhydrAMINE (BENADRYL ALLERGY) 25 mg tablet Take 50 mg by mouth every six (6) hours as needed for Sleep.  melatonin tab tablet Take 10 mg by mouth nightly.  zolpidem (AMBIEN) 5 mg tablet Take 5 mg by mouth.  acetaminophen (TYLENOL) 325 mg tablet Take  by mouth every four (4) hours as needed for Pain.  levothyroxine (SYNTHROID) 75 mcg tablet Take 100 mcg by mouth Daily (before breakfast).  ibuprofen (MOTRIN IB) 200 mg tablet Take  by mouth. Past Medical History:   Diagnosis Date    Hashimoto's disease      Family History   Problem Relation Age of Onset    Hypertension Mother     Cancer Mother          ROS:  All other systems reviewed and negative aside from that written in the HPI. Objective:  Resp 15   Ht 5' 7\" (1.702 m)   Wt 154 lb (69.9 kg)   BMI 24.12 kg/m²   GEN: Appears stated age in NAD. EYES: Conjunctiva and lids normal.  PERRL.  ENT: External ears and nose without lesions/trauma.  Hearing Intact.  Tongue midline. NECK: supple, non-tender and symmetrical.  Spurling negative  HEART: no peripheral edema  LUNGS: Normal effort  NEURO:  CN 2-12 grossly Intact.  DTRs normal biceps, triceps, patellar and Achilles bilateral .  Sensation intact to light touch.  within normal limits rapid alternating movements.  Romberg/pronator drift within normal limits.  Tandem leg balance test (VINAY) 1 error.  Conjugate gaze to 6cm.   MS: Gait and Station normal.  no clubbing/cyanosis.  Strength/tone normal throughout upper and lower extremities. SKIN: Warm/dry w/o rash  HEENT: Conjunctiva/lids WNL. External canals/nares WNL. Tongue midline. PERRL, EOMI. Hearing intact. NECK: Trachea midline. Supple, Full ROM. No thyromegaly. CARDIAC: No edema. LUNGS: Normal effort. ABD: Soft, no masses. No HSM. PSYCH: A+O x3. Appropriate judgment and insight. Assessment/Plan:     ICD-10-CM ICD-9-CM    1. Concussion without loss of consciousness, subsequent encounter S06.0X0D V58.89      850.0    2. Work related injury Y99.0 959.9      Patient (or guardian if minor) verbalizes understanding of evaluation and plan. 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 Restart trazoone w/ ambien PRN and RTC 4 weeks. Will call if needs zofran refilled. Will refer chiro as great benefit and recommended by Dr. Oliver Elio prior. .     Time with Pt 42 minutes, >50% of which was counseling pt regarding Dx and Tx options and coordination of care.

## 2020-01-08 ENCOUNTER — TELEPHONE (OUTPATIENT)
Dept: ORTHOPEDIC SURGERY | Age: 50
End: 2020-01-08

## 2020-01-08 NOTE — TELEPHONE ENCOUNTER
Patient called asking for Milly Bruner assistant, to return her call, as she has two questions. Please advise.     [phone]

## 2020-01-08 NOTE — TELEPHONE ENCOUNTER
Called Pt and had question for JMM, but unrelated to Pt's care. AT will email JMM regarding question.

## 2020-01-13 ENCOUNTER — OFFICE VISIT (OUTPATIENT)
Dept: ORTHOPEDIC SURGERY | Age: 50
End: 2020-01-13

## 2020-01-13 VITALS
SYSTOLIC BLOOD PRESSURE: 122 MMHG | HEIGHT: 67 IN | BODY MASS INDEX: 24.33 KG/M2 | WEIGHT: 155 LBS | HEART RATE: 59 BPM | TEMPERATURE: 97.6 F | DIASTOLIC BLOOD PRESSURE: 78 MMHG | RESPIRATION RATE: 15 BRPM
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
## 2020-01-13 DIAGNOSIS — S06.0X0D CONCUSSION WITHOUT LOSS OF CONSCIOUSNESS, SUBSEQUENT ENCOUNTER: Primary | ICD-10-CM

## 2020-01-13 RX ORDER — GABAPENTIN 100 MG/1
CAPSULE ORAL
Qty: 90 CAP | Refills: 2 | Status: SHIPPED | OUTPATIENT
Start: 2020-01-13 | End: 2020-04-21

## 2020-01-13 NOTE — PROGRESS NOTES
AVS reviewed: YES  HEP: N/A  Resources Provided: YES work letter  Patient questions/concerns answered: YES.  Confirmed with Pt &  that work notes cover for her return to work  Patient verbalized understanding of treatment plan: YES

## 2020-01-13 NOTE — PATIENT INSTRUCTIONS
Continue to work within restrictions and increase activity as tolerated, use medication as prescribed, and return to the office in about 4 weeks.

## 2020-01-13 NOTE — PROGRESS NOTES
Pt states that her worse headache was on last Wednesday after working. Lights and sirens cause headaches and nausea. Pt is working 8- 10 hours a day most days. Pt is working at least 8 hours a day 3 days a week. Pt has some dizziness periodically mainly at night but the sensation will subside in a few minutes. Pt states that the days that she has bumped up Trazodone to 150 mg for helping with the sleep. Pt has noticed that when she does sleep more she feels a little better.

## 2020-01-13 NOTE — PROGRESS NOTES
HISTORY OF PRESENT ILLNESS    Jadon Machuca is a 47y.o. 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 Patient has tried:  up to Trazodone 150 mg for 2-3 weeks and ambien 5 mg if needed later. Pain rated  9/10 top head and described as pressure and or burning. Does have HA daily but usually very mild/annoyance. Is able to get to 10 hour shifts most days not on ambulance the whole time. Will have the feeling like coming off a boat for a while after getting done and laying in bed. Photophobia: some Phonophobia: yes Sleep issues: improved More emotional: improved Dizziness: some but more rare Nausea: yes LOC: no Trouble concentrating/foggy feeling: improved    IMAGING: MRI brain normal 7/29/19 per report    Neuropsych done 10/21/19 with report 11/8/19 Dr. Alexi Liu (report scanned). Social History     Socioeconomic History    Marital status: UNKNOWN     Spouse name: Not on file    Number of children: Not on file    Years of education: Not on file    Highest education level: Not on file   Tobacco Use    Smoking status: Never Smoker    Smokeless tobacco: Never Used   Substance and Sexual Activity    Alcohol use: Yes     Alcohol/week: 1.0 standard drinks     Types: 1 Glasses of wine per week     Comment: rarely     Drug use: Never     Current Outpatient Medications   Medication Sig Dispense Refill    traZODone (DESYREL) 50 mg tablet Take 2 Tabs by mouth nightly. 60 Tab 0    ondansetron hcl (ZOFRAN) 4 mg tablet Take 1 Tab by mouth every eight (8) hours as needed for Nausea. 21 Tab 0    fish oil-omega-3 fatty acids 300-500 mg cap Take  by mouth.  multivitamin (ONE A DAY) tablet Take 1 Tab by mouth daily.  cholecalciferol (VITAMIN D3) 1,000 unit cap Take  by mouth daily.  omega 3-DHA-EPA-fish oil 1,000 mg (120 mg-180 mg) capsule Take 1 Cap by mouth daily.       co-enzyme Q-10 (CO Q-10) 100 mg capsule Take 100 mg by mouth daily.  melatonin tab tablet Take 10 mg by mouth nightly.  acetaminophen (TYLENOL) 325 mg tablet Take  by mouth every four (4) hours as needed for Pain.  levothyroxine (SYNTHROID) 75 mcg tablet Take 100 mcg by mouth Daily (before breakfast).  ibuprofen (MOTRIN IB) 200 mg tablet Take  by mouth.  diphenhydrAMINE (BENADRYL ALLERGY) 25 mg tablet Take 50 mg by mouth every six (6) hours as needed for Sleep.  zolpidem (AMBIEN) 5 mg tablet Take 5 mg by mouth. Past Medical History:   Diagnosis Date    Hashimoto's disease      Family History   Problem Relation Age of Onset    Hypertension Mother     Cancer Mother          ROS:  All other systems reviewed and negative aside from that written in the HPI. Objective:  /78   Pulse (!) 59   Temp 97.6 °F (36.4 °C)   Resp 15   Ht 5' 7\" (1.702 m)   Wt 155 lb (70.3 kg)   BMI 24.28 kg/m²   GEN: Appears stated age in NAD. EYES: Conjunctiva and lids normal.  PERRL.  ENT: External ears and nose without lesions/trauma.  Hearing Intact.  Tongue midline. NECK: supple, non-tender and symmetrical.  Spurling negative  HEART: no peripheral edema  LUNGS: Normal effort  NEURO:  CN 2-12 grossly Intact.  DTRs normal biceps, triceps, patellar and Achilles bilateral .  Sensation intact to light touch.  within normal limits rapid alternating movements.  Romberg/pronator drift within normal limits.  Tandem leg balance test (VINAY) 2 errors.  Conjugate gaze to 6cm w/ Sx.  MS: Gait and Station normal.  no clubbing/cyanosis.  Strength/tone normal throughout upper and lower extremities. SKIN: Warm/dry w/o rash  HEENT: Conjunctiva/lids WNL. External canals/nares WNL. Tongue midline. PERRL, EOMI. Hearing intact. NECK: Trachea midline. Supple, Full ROM. No thyromegaly. CARDIAC: No edema. LUNGS: Normal effort. ABD: Soft, no masses. No HSM. PSYCH: A+O x3.  Appropriate judgment and insight. Assessment/Plan:     ICD-10-CM ICD-9-CM    1. Concussion without loss of consciousness, subsequent encounter S06.0X0D V58.89      850.0    2. Work related injury Y99.0 959.9        Patient (or guardian if minor) verbalizes understanding of evaluation and plan. Will continue increasing activities and trial gabapentin 100mg at bed and may increase from there. Consider immediate release melatonin for waking in the night.

## 2020-01-20 DIAGNOSIS — S06.0X0D CONCUSSION WITHOUT LOSS OF CONSCIOUSNESS, SUBSEQUENT ENCOUNTER: ICD-10-CM

## 2020-01-21 RX ORDER — TRAZODONE HYDROCHLORIDE 50 MG/1
TABLET ORAL
Qty: 60 TAB | Refills: 0 | Status: SHIPPED | OUTPATIENT
Start: 2020-01-21 | End: 2020-02-13 | Stop reason: SDUPTHER

## 2020-02-13 ENCOUNTER — OFFICE VISIT (OUTPATIENT)
Dept: ORTHOPEDIC SURGERY | Age: 50
End: 2020-02-13

## 2020-02-13 VITALS
HEART RATE: 68 BPM | HEIGHT: 67 IN | SYSTOLIC BLOOD PRESSURE: 126 MMHG | RESPIRATION RATE: 15 BRPM | WEIGHT: 153 LBS | DIASTOLIC BLOOD PRESSURE: 83 MMHG | BODY MASS INDEX: 24.01 KG/M2 | TEMPERATURE: 98 F

## 2020-02-13 DIAGNOSIS — S06.0X0D CONCUSSION WITHOUT LOSS OF CONSCIOUSNESS, SUBSEQUENT ENCOUNTER: Primary | ICD-10-CM
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
## 2020-02-13 RX ORDER — TRAZODONE HYDROCHLORIDE 50 MG/1
TABLET ORAL
Qty: 60 TAB | Refills: 2 | Status: SHIPPED | OUTPATIENT
Start: 2020-02-13 | End: 2020-03-12 | Stop reason: SDUPTHER

## 2020-02-13 NOTE — LETTER
NOTIFICATION RETURN TO WORK / SCHOOL 
 
2/13/2020 9:16 AM 
 
Ms. Melanie Corona [address] [address] To Whom It May Concern: 
 
Melanie Corona is currently under the care of [address] [place] [address]. She will return to work/school on: 2/13/2020. She may work for up to 12 hours per day and no more than 3 days per week, as tolerated. During work hours, she may provide patient care accompanied. If there are questions or concerns please have the patient contact our office.  
 
 
 
Sincerely, 
 
 
Dilma Salazar, DO

## 2020-02-13 NOTE — PROGRESS NOTES
Pt states that her headaches have been noticeable more frequent the past week. Pt states that she has some burning and pressure at times but not as severe.

## 2020-02-13 NOTE — PROGRESS NOTES
AVS reviewed: YES  HEP: N/A  Resources Provided: YES work letter  Patient questions/concerns answered: YES.  Confirmed gabapentin has 2 refills on it  Patient verbalized understanding of treatment plan: YES

## 2020-02-13 NOTE — PATIENT INSTRUCTIONS
Continue with medication as prescribed, increase work as tolerated within restrictions, and return to the office in about 4 weeks.

## 2020-02-13 NOTE — PROGRESS NOTES
HISTORY OF PRESENT ILLNESS    Rosalind Mena is a 47y.o. year old female that comes in today for follow-up: concussion    Since last appt Sx are improved and sleep is better after neurontin 100mg BID but could not tolerate TID so using 100mg in AM and 200mg at bed and pleased. Added to trazodone 100mg and rare ambien. Pain rated  9/10 top head and described as pressure/burning. Feels great in morning aside from grogginess. 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 Tolerating 10 hours 3 days a week. Photophobia: some Phonophobia: yes Sleep issues: improved More emotional: improved Dizziness: some but more rare Nausea: yes LOC: no Trouble concentrating/foggy feeling: improved     IMAGING: MRI brain normal 7/29/19 per report     Neuropsych done 10/21/19 with report 11/8/19 Dr. Jignesh Chávez (report scanned). Social History     Socioeconomic History    Marital status: UNKNOWN     Spouse name: Not on file    Number of children: Not on file    Years of education: Not on file    Highest education level: Not on file   Tobacco Use    Smoking status: Never Smoker    Smokeless tobacco: Never Used   Substance and Sexual Activity    Alcohol use: Yes     Alcohol/week: 1.0 standard drinks     Types: 1 Glasses of wine per week     Comment: rarely     Drug use: Never     Current Outpatient Medications   Medication Sig Dispense Refill    traZODone (DESYREL) 50 mg tablet TAKE 2 TABLETS EVERY NIGHT 60 Tab 0    gabapentin (NEURONTIN) 100 mg capsule Start 1 tab at bed then in 1 week increase to 1 tab twice daily and after another week increase to 3 tabs daily. 90 Cap 2    ondansetron hcl (ZOFRAN) 4 mg tablet Take 1 Tab by mouth every eight (8) hours as needed for Nausea. 21 Tab 0    fish oil-omega-3 fatty acids 300-500 mg cap Take  by mouth.  multivitamin (ONE A DAY) tablet Take 1 Tab by mouth daily.  cholecalciferol (VITAMIN D3) 1,000 unit cap Take  by mouth daily.  co-enzyme Q-10 (CO Q-10) 100 mg capsule Take 100 mg by mouth daily.  melatonin tab tablet Take 10 mg by mouth nightly.  zolpidem (AMBIEN) 5 mg tablet Take 5 mg by mouth.  acetaminophen (TYLENOL) 325 mg tablet Take  by mouth every four (4) hours as needed for Pain.  levothyroxine (SYNTHROID) 75 mcg tablet Take 100 mcg by mouth Daily (before breakfast).  ibuprofen (MOTRIN IB) 200 mg tablet Take  by mouth.  omega 3-DHA-EPA-fish oil 1,000 mg (120 mg-180 mg) capsule Take 1 Cap by mouth daily.  diphenhydrAMINE (BENADRYL ALLERGY) 25 mg tablet Take 50 mg by mouth every six (6) hours as needed for Sleep. Past Medical History:   Diagnosis Date    Hashimoto's disease      Family History   Problem Relation Age of Onset    Hypertension Mother     Cancer Mother          ROS:  All other systems reviewed and negative aside from that written in the HPI. Objective:  /83   Pulse 68   Temp 98 °F (36.7 °C)   Resp 15   Ht 5' 7\" (1.702 m)   Wt 153 lb (69.4 kg)   BMI 23.96 kg/m²   GEN: Appears stated age in NAD. EYES: Conjunctiva and lids normal.  PERRL.  ENT: External ears and nose without lesions/trauma.  Hearing Intact.  Tongue midline. NECK: supple, non-tender and symmetrical.  Spurling negative  HEART: no peripheral edema  LUNGS: Normal effort  NEURO:  CN 2-12 grossly Intact.  DTRs normal biceps, triceps, patellar and Achilles bilateral .  Sensation intact to light touch.  within normal limits rapid alternating movements.  Romberg/pronator drift within normal limits.  Tandem leg balance test (VINAY) 2 errors.  Conjugate gaze to 6cm w/ Sx.  MS: Gait and Station normal.  no clubbing/cyanosis.  Strength/tone normal throughout upper and lower extremities. SKIN: Warm/dry w/o rash  HEENT: Conjunctiva/lids WNL. External canals/nares WNL. Tongue midline. PERRL, EOMI. Hearing intact. NECK: Trachea midline. Supple, Full ROM. No thyromegaly. CARDIAC: No edema.   LUNGS: Normal effort. ABD: Soft, no masses. No HSM. PSYCH: A+O x3. Appropriate judgment and insight. Assessment/Plan:     ICD-10-CM ICD-9-CM    1. Concussion without loss of consciousness, subsequent encounter S06.0X0D V58.89 traZODone (DESYREL) 50 mg tablet     850.0    2. Work related injury Y99.0 959.9      Patient (or guardian if minor) verbalizes understanding of evaluation and plan. Will continue increase activities and refill trazodone. Maintain gabapentin 100mg in AM and 200mg at bed. Letter to allow work 3 days/week up to 12 hours per shift.

## 2020-03-12 ENCOUNTER — OFFICE VISIT (OUTPATIENT)
Dept: ORTHOPEDIC SURGERY | Age: 50
End: 2020-03-12

## 2020-03-12 VITALS
HEART RATE: 72 BPM | DIASTOLIC BLOOD PRESSURE: 83 MMHG | WEIGHT: 152 LBS | RESPIRATION RATE: 15 BRPM | SYSTOLIC BLOOD PRESSURE: 142 MMHG | BODY MASS INDEX: 23.86 KG/M2 | TEMPERATURE: 97.9 F | HEIGHT: 67 IN
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
## 2020-03-12 DIAGNOSIS — S06.0X0D CONCUSSION WITHOUT LOSS OF CONSCIOUSNESS, SUBSEQUENT ENCOUNTER: Primary | ICD-10-CM

## 2020-03-12 RX ORDER — LORATADINE 10 MG/1
10 TABLET ORAL
COMMUNITY

## 2020-03-12 RX ORDER — FLUTICASONE PROPIONATE 50 MCG
2 SPRAY, SUSPENSION (ML) NASAL DAILY
COMMUNITY

## 2020-03-12 RX ORDER — TRAZODONE HYDROCHLORIDE 50 MG/1
TABLET ORAL
Qty: 60 TAB | Refills: 2 | Status: SHIPPED | OUTPATIENT
Start: 2020-03-12 | End: 2020-06-16 | Stop reason: SDUPTHER

## 2020-03-12 NOTE — PATIENT INSTRUCTIONS
Continue with gabapentin: 100mg in the morning and 200mg at bed trazodone. Decrease trazadone from 100mg at bed to 50mg over several days and okay to add ambien/melatonin, if needed. Return to the office in about 4 weeks.

## 2020-03-12 NOTE — PROGRESS NOTES
HISTORY OF PRESENT ILLNESS    Jack Salas is a 47y.o. year old female that comes in today for follow-up: concussion    She feels like she is getting better but is still quite exhausted but able to work three 12 hour shifts/week. Has headaches frequently but are manageable. Still has nausea and only has needed to take Zofran 3 times. 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 Attended a 49th birthday and a celebration of life at a  home and was able to tolerate both for about 2 hours. Pt states that she has a couple of nights with some sleep issues. Pain 7/10 top head and pressure. Had great benefit gabapentin 100mg in AM and 200mg neurontin w/ 100mg trazodone w/ 10mg extended release melatonin but will have issues with waking often a couple times a week. Had used ambien prior to injury and did use 2.5mg last month but did get HA. Photophobia: some Phonophobia: yes Sleep issues: improved More emotional: improved Dizziness: some but more rare Nausea: some LOC: no Trouble concentrating/foggy feeling: improved    IMAGING: MRI brain normal 19 per report     Neuropsych done 10/21/19 with report 19 Dr. Amy Devries (report scanned). Social History     Socioeconomic History    Marital status: UNKNOWN     Spouse name: Not on file    Number of children: Not on file    Years of education: Not on file    Highest education level: Not on file   Tobacco Use    Smoking status: Never Smoker    Smokeless tobacco: Never Used   Substance and Sexual Activity    Alcohol use: Yes     Alcohol/week: 1.0 standard drinks     Types: 1 Glasses of wine per week     Comment: rarely     Drug use: Never     Current Outpatient Medications   Medication Sig Dispense Refill    fluticasone propionate (Flonase Allergy Relief) 50 mcg/actuation nasal spray 2 Sprays by Both Nostrils route daily.  loratadine (Claritin) 10 mg tablet Take 10 mg by mouth.       traZODone (DESYREL) 50 mg tablet TAKE 2 TABLETS EVERY NIGHT 60 Tab 2    gabapentin (NEURONTIN) 100 mg capsule Start 1 tab at bed then in 1 week increase to 1 tab twice daily and after another week increase to 3 tabs daily. 90 Cap 2    ondansetron hcl (ZOFRAN) 4 mg tablet Take 1 Tab by mouth every eight (8) hours as needed for Nausea. 21 Tab 0    multivitamin (ONE A DAY) tablet Take 1 Tab by mouth daily.  cholecalciferol (VITAMIN D3) 1,000 unit cap Take  by mouth daily.  melatonin tab tablet Take 10 mg by mouth nightly.  acetaminophen (TYLENOL) 325 mg tablet Take  by mouth every four (4) hours as needed for Pain.  levothyroxine (SYNTHROID) 75 mcg tablet Take 88 mcg by mouth Daily (before breakfast).  ibuprofen (MOTRIN IB) 200 mg tablet Take  by mouth.  fish oil-omega-3 fatty acids 300-500 mg cap Take  by mouth.  omega 3-DHA-EPA-fish oil 1,000 mg (120 mg-180 mg) capsule Take 1 Cap by mouth daily.  co-enzyme Q-10 (CO Q-10) 100 mg capsule Take 100 mg by mouth daily.  diphenhydrAMINE (BENADRYL ALLERGY) 25 mg tablet Take 50 mg by mouth every six (6) hours as needed for Sleep.  zolpidem (AMBIEN) 5 mg tablet Take 5 mg by mouth. Past Medical History:   Diagnosis Date    Hashimoto's disease      Family History   Problem Relation Age of Onset    Hypertension Mother     Cancer Mother          ROS:  All other systems reviewed and negative aside from that written in the HPI. Objective:  /83   Pulse 72   Temp 97.9 °F (36.6 °C)   Resp 15   Ht 5' 7\" (1.702 m)   Wt 152 lb (68.9 kg)   BMI 23.81 kg/m²   GEN: Appears stated age in NAD. EYES: Conjunctiva and lids normal.  PERRL.  ENT: External ears and nose without lesions/trauma.  Hearing Intact.  Tongue midline. NECK: supple, non-tender and symmetrical.  Spurling negative  HEART: no peripheral edema  LUNGS: Normal effort  NEURO:  CN 2-12 grossly Intact.  DTRs normal biceps, triceps, patellar and Achilles bilateral .  Sensation intact to light touch.  within normal limits rapid alternating movements.  Romberg/pronator drift within normal limits.  Tandem leg balance test (VINAY) 1 error.  Conjugate gaze to 8cm w/ Sx.  MS: Gait and Station normal.  no clubbing/cyanosis.  Strength/tone normal throughout upper and lower extremities. SKIN: Warm/dry w/o rash  HEENT: Conjunctiva/lids WNL. External canals/nares WNL. Tongue midline. PERRL, EOMI. Hearing intact. NECK: Trachea midline. Supple, Full ROM. No thyromegaly. CARDIAC: No edema. LUNGS: Normal effort. ABD: Soft, no masses. No HSM. PSYCH: A+O x3. Appropriate judgment and insight. Assessment/Plan:     ICD-10-CM ICD-9-CM    1. Concussion without loss of consciousness, subsequent encounter S06.0X0D V58.89 traZODone (DESYREL) 50 mg tablet     850.0    2. Work related injury Y99.0 959.9      Patient (or guardian if minor) verbalizes understanding of evaluation and plan. Will continue gabapentin 100mg AM, at bed 200mg w/ trazodone decrease from 100mg at bed to 50mg over several days and okay to ad ambien/melatonin if needed. Likely issues with grogginess from trazodone dose. RTC 4 weeks. Time with Pt 25 minutes, >50% of which was counseling pt regarding Dx and Tx options and coordination of care.

## 2020-03-12 NOTE — LETTER
NOTIFICATION RETURN TO WORK / SCHOOL 
 
3/12/2020 9:39 AM 
 
Ms. Katherine Millan [address] [address] To Whom It May Concern: 
 
Katherine Millan is currently under the care of [address] [place] [address]. She will return to work/school on: 3/12/2020. She may work for up to 36 hours per week and no more than 24 hours per shirt, as tolerated. During work hours, she may provide patient care accompanied. If there are questions or concerns please have the patient contact our office.  
 
 
 
Sincerely, 
 
 
Berta Kim, DO

## 2020-03-12 NOTE — LETTER
NOTIFICATION RETURN TO WORK / SCHOOL 
 
3/12/2020 10:03 AM 
 
Ms. Michel Recio [address] [address] To Whom It May Concern: 
 
Michel Recio is currently under the care of [address] [place] [address]. She will return to work/school on: 3/12/2020. 
  
She may work for up to 36 hours per week and no more than 24 hours per shift, as tolerated. During work hours, she may provide patient care accompanied.   
 
 
 
Sincerely, 
 
 
Yazmin Wilburn, DO

## 2020-03-12 NOTE — PROGRESS NOTES
AVS reviewed: YES  HEP: N/A  Resources Provided: YES work letter  Patient questions/concerns answered: NO. Pt &  denied questions/concerns.   Patient verbalized understanding of treatment plan: YES

## 2020-03-12 NOTE — PROGRESS NOTES
Pt states that she feels like she is getting better but is still quite exhausted. Pt states that she has headaches frequently but are manageable. Pt states that she still has nausea and only has needed to take Zofran 3 times. 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 Pt attended a 49th birthday and a celebration of life at a  home and was able to tolerate both for about 2 hours. Pt states that she has a couple of nights with some sleep issues.

## 2020-04-07 ENCOUNTER — TELEPHONE (OUTPATIENT)
Dept: ORTHOPEDIC SURGERY | Age: 50
End: 2020-04-07

## 2020-04-07 NOTE — TELEPHONE ENCOUNTER
Pt confirmed will attend in office visit. Willing to switch to VV in future, if needed. Travel screen completed.

## 2020-04-09 ENCOUNTER — OFFICE VISIT (OUTPATIENT)
Dept: ORTHOPEDIC SURGERY | Age: 50
End: 2020-04-09

## 2020-04-09 VITALS
HEART RATE: 76 BPM | TEMPERATURE: 98.1 F | RESPIRATION RATE: 15 BRPM | WEIGHT: 156 LBS | BODY MASS INDEX: 24.48 KG/M2 | DIASTOLIC BLOOD PRESSURE: 65 MMHG | HEIGHT: 67 IN | SYSTOLIC BLOOD PRESSURE: 110 MMHG
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
## 2020-04-09 DIAGNOSIS — S06.0X0D CONCUSSION WITHOUT LOSS OF CONSCIOUSNESS, SUBSEQUENT ENCOUNTER: Primary | ICD-10-CM

## 2020-04-09 NOTE — LETTER
NOTIFICATION RETURN TO WORK / SCHOOL 
 
4/9/2020 9:43 AM 
 
Ms. Rocío Adams [address] [address] To Whom It May Concern: 
 
Rocío Adams is currently under the care of [address] [place] [address]. She will return to work/school on: 4/10/2020 She may work for up to 36 hours per week and no more than 24 hours per shift, as tolerated. During work hours, she may provide patient care accompanied during 24 hour shifts and can provide care independent during 12 hour shifts. If there are questions or concerns please have the patient contact our office.  
 
 
 
Sincerely, 
 
 
Conrad Kwan, DO

## 2020-04-09 NOTE — PROGRESS NOTES
Pt states that she has had boughts of nausea and visual disturbances. Pt has had tracking issues with her vision. 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 Burning headaches at periods.

## 2020-04-09 NOTE — PROGRESS NOTES
HISTORY OF PRESENT ILLNESS    Juli Olmos 1970 is a 47y.o. year old female that comes in today for follow-up: concussion    Since last appt Sx are improved and has tolerated three 24 hour shifts (had a lot of issues after 15 hours of 1st 24 hours) 1/week for last three weeks. Also a 12 hour shift those same weeks. Grogginess has improved with trazodone to 50mg and neurontin 100mg in AM and 200mg at bed (only 100mg at bed 24 hour an dno trazodone then) and had to use 12.5mg ambien 8-10 times since. Pain rated  4/10 less often now domitila left head and described as pressure/burning. A lot of issues with visual tracking causing nausea especially TV or reading/computer screen. Also had similar issue from painting w/ brush causing nausea. Photophobia: yes Phonophobia: yes Sleep issues: see above More emotional: improved Dizziness: mild and rare Nausea: yes LOC: no Trouble concentrating/foggy feeling: improved    IMAGING: MRI brain normal 7/29/19 per report     Neuropsych done 10/21/19 with report 11/8/19 Dr. Marlen Fregoso (report scanned). Social History     Socioeconomic History    Marital status: UNKNOWN     Spouse name: Not on file    Number of children: Not on file    Years of education: Not on file    Highest education level: Not on file   Tobacco Use    Smoking status: Never Smoker    Smokeless tobacco: Never Used   Substance and Sexual Activity    Alcohol use: Yes     Alcohol/week: 1.0 standard drinks     Types: 1 Glasses of wine per week     Comment: rarely     Drug use: Never     Current Outpatient Medications   Medication Sig Dispense Refill    fluticasone propionate (Flonase Allergy Relief) 50 mcg/actuation nasal spray 2 Sprays by Both Nostrils route daily.  loratadine (Claritin) 10 mg tablet Take 10 mg by mouth.       traZODone (DESYREL) 50 mg tablet TAKE 1-2 TABLETS EVERY NIGHT 60 Tab 2    gabapentin (NEURONTIN) 100 mg capsule Start 1 tab at bed then in 1 week increase to 1 tab twice daily and after another week increase to 3 tabs daily. 90 Cap 2    ondansetron hcl (ZOFRAN) 4 mg tablet Take 1 Tab by mouth every eight (8) hours as needed for Nausea. 21 Tab 0    multivitamin (ONE A DAY) tablet Take 1 Tab by mouth daily.  cholecalciferol (VITAMIN D3) 1,000 unit cap Take  by mouth daily.  melatonin tab tablet Take 10 mg by mouth nightly.  zolpidem (AMBIEN) 5 mg tablet Take 5 mg by mouth.  acetaminophen (TYLENOL) 325 mg tablet Take  by mouth every four (4) hours as needed for Pain.  levothyroxine (SYNTHROID) 75 mcg tablet Take 88 mcg by mouth Daily (before breakfast).  ibuprofen (MOTRIN IB) 200 mg tablet Take  by mouth.  fish oil-omega-3 fatty acids 300-500 mg cap Take  by mouth.  omega 3-DHA-EPA-fish oil 1,000 mg (120 mg-180 mg) capsule Take 1 Cap by mouth daily.  co-enzyme Q-10 (CO Q-10) 100 mg capsule Take 100 mg by mouth daily.  diphenhydrAMINE (BENADRYL ALLERGY) 25 mg tablet Take 50 mg by mouth every six (6) hours as needed for Sleep. Past Medical History:   Diagnosis Date    Hashimoto's disease      Family History   Problem Relation Age of Onset    Hypertension Mother     Cancer Mother        ROS:  All other systems reviewed and negative aside from that written in the HPI. Objective:  /65   Pulse 76   Temp 98.1 °F (36.7 °C)   Resp 15   Ht 5' 7\" (1.702 m)   Wt 156 lb (70.8 kg)   BMI 24.43 kg/m²   GEN: Appears stated age in NAD. EYES: Conjunctiva and lids normal.  PERRL.  ENT: External ears and nose without lesions/trauma.  Hearing Intact.  Tongue midline. NECK: supple, non-tender and symmetrical.  Spurling negative  HEART: no peripheral edema  LUNGS: Normal effort  NEURO:  CN 2-12 grossly Intact.   DTRs normal biceps, triceps, patellar and Achilles bilateral .  Sensation intact to light touch.  within normal limits rapid alternating movements.  Romberg/pronator drift within normal limits.  Tandem leg balance test (VINAY) 1 error.  Conjugate gaze to 8cm w/ Sx.  MS: Gait and Station normal.  no clubbing/cyanosis.  Strength/tone normal throughout upper and lower extremities. SKIN: Warm/dry w/o rash  HEENT: Conjunctiva/lids WNL. External canals/nares WNL. Tongue midline. PERRL, EOMI. Hearing intact. NECK: Trachea midline. Supple, Full ROM. No thyromegaly. CARDIAC: No edema. LUNGS: Normal effort. ABD: Soft, no masses. No HSM. PSYCH: A+O x3. Appropriate judgment and insight. Assessment/Plan:     ICD-10-CM ICD-9-CM    1. Concussion without loss of consciousness, subsequent encounter S06.0X0D V58.89      850.0    2. Work related injury Y99.0 959.9        Patient (or guardian if minor) verbalizes understanding of evaluation and plan. Will continue current work schedule and Rx w/ independent for 12 hour shifts and accompanied fo the 24 hour shifts. Plan f/u 4 weeks VV, and hope from [address] [place] [address] in about 8 weeks.

## 2020-04-10 ENCOUNTER — DOCUMENTATION ONLY (OUTPATIENT)
Dept: ORTHOPEDIC SURGERY | Age: 50
End: 2020-04-10

## 2020-04-10 NOTE — PROGRESS NOTES
Office received fax from  requesting records of 4/9/20 office visit. AT faxed requested documents to . Fax confirmation received.      Flori Londono  Fax #: [phone]

## 2020-05-08 ENCOUNTER — VIRTUAL VISIT (OUTPATIENT)
Dept: ORTHOPEDIC SURGERY | Age: 50
End: 2020-05-08

## 2020-05-08 DIAGNOSIS — S06.0X0D CONCUSSION WITHOUT LOSS OF CONSCIOUSNESS, SUBSEQUENT ENCOUNTER: Primary | ICD-10-CM
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
## 2020-05-08 NOTE — LETTER
NOTIFICATION RETURN TO WORK / SCHOOL 
 
5/8/2020 10:01 AM 
 
Ms. Yen Castillo [address] [place] [id number] To Whom It May Concern: 
 
Yen Castillo is currently under the care of [facility]. She will return to work/school on: 5/9/2020 She may work for up to 36 hours per week and no more than 24 hours per shift, as tolerated. 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 Will begin ride-along on helicopter as tolerated to begin acclimatization. If there are questions or concerns please have the patient contact our office.  
 
 
 
Sincerely, 
 
 
Richard Mittal, DO

## 2020-05-08 NOTE — PROGRESS NOTES
Charlynn Bloch 1970 is a 47 y.o. female who was seen by synchronous (real-time) audio-video technology doxy. me on 5/8/2020     This visit occurred during the [facility] public health emergency. Consent:  She and/or her healthcare decision maker is aware that this patient-initiated Telehealth encounter is a billable service, with coverage as determined by her insurance carrier. She is aware that she may receive a bill and has provided verbal consent to proceed: Yes    I am at my home while conducting this encounter, and the patient is at home alone. Assessment & Plan:       ICD-10-CM ICD-9-CM    1. Concussion without loss of consciousness, subsequent encounter S06.0X0D V58.89      850.0    2. Work related injury Y99.0 959.9        Continue current but allow unaccompanied al shifts aside from night portion of 24 hour and start helicopter ride-alongs. Time with Pt 25 minutes, >50% of which was counseling pt regarding Dx and Tx options discussion of increasing work limits and trial helicopter and coordination of care. Will get work note from [employer]. Return 4 weeks likely VV. Patient (or parent/guardian if minor) verbalized understanding of evaluation and treatment plan. 712  Subjective:   Charlynn Bloch 1970 was seen for Concussion      Since last appt has noticed continued slow improvement and no headache for 2 weeks until last night after 24 hour shift w/ pain level 1-2/10 less often now top left head and described as pressure/burning. Using rare zofran with benefit. Has had issues with sleep so trazodone to 75mg and using ambien half of 5mg here and there and doing great. Using neurontin 100mg in AM and 200mg at bed but only 100mg neurontin Rx for 24 hr shifts. Has done very well with caring for children and not needing assistance.     Did have vertigo last week one night while sitting on sofa lasted a few hours but resolved after sleep that night    Does need to get more exposure to helicopter as caused Sx prior. Photophobia: nearly resolved Phonophobia: nearly resolved Sleep issues: see above More emotional: improved Dizziness: mild and rare Nausea: yes LOC: no Trouble concentrating/foggy feeling: improved    IMAGING: MRI brain normal 7/29/19 per report     Neuropsych done 10/21/19 with report 11/8/19 Dr. Wilner Villarreal (report scanned). Current Outpatient Medications   Medication Sig Dispense Refill    gabapentin (NEURONTIN) 100 mg capsule 1 tab in morning at 1-2 tabs at bed 270 Cap 0    fluticasone propionate (Flonase Allergy Relief) 50 mcg/actuation nasal spray 2 Sprays by Both Nostrils route daily.  loratadine (Claritin) 10 mg tablet Take 10 mg by mouth.  traZODone (DESYREL) 50 mg tablet TAKE 1-2 TABLETS EVERY NIGHT 60 Tab 2    ondansetron hcl (ZOFRAN) 4 mg tablet Take 1 Tab by mouth every eight (8) hours as needed for Nausea. 21 Tab 0    fish oil-omega-3 fatty acids 300-500 mg cap Take  by mouth.  multivitamin (ONE A DAY) tablet Take 1 Tab by mouth daily.  cholecalciferol (VITAMIN D3) 1,000 unit cap Take  by mouth daily.  omega 3-DHA-EPA-fish oil 1,000 mg (120 mg-180 mg) capsule Take 1 Cap by mouth daily.  co-enzyme Q-10 (CO Q-10) 100 mg capsule Take 100 mg by mouth daily.  diphenhydrAMINE (BENADRYL ALLERGY) 25 mg tablet Take 50 mg by mouth every six (6) hours as needed for Sleep.  melatonin tab tablet Take 10 mg by mouth nightly.  zolpidem (AMBIEN) 5 mg tablet Take 5 mg by mouth.  acetaminophen (TYLENOL) 325 mg tablet Take  by mouth every four (4) hours as needed for Pain.  levothyroxine (SYNTHROID) 75 mcg tablet Take 88 mcg by mouth Daily (before breakfast).  ibuprofen (MOTRIN IB) 200 mg tablet Take  by mouth.        Past Surgical History:   Procedure Laterality Date    HX BREAST BIOPSY      HX HYSTERECTOMY      HX KNEE ARTHROSCOPY      HX MALIGNANT SKIN LESION EXCISION       Social History     Socioeconomic History  Marital status: UNKNOWN     Spouse name: Not on file    Number of children: Not on file    Years of education: Not on file    Highest education level: Not on file   Tobacco Use    Smoking status: Never Smoker    Smokeless tobacco: Never Used   Substance and Sexual Activity    Alcohol use: Yes     Alcohol/week: 1.0 standard drinks     Types: 1 Glasses of wine per week     Comment: rarely     Drug use: Never     Past Medical History:   Diagnosis Date    Hashimoto's disease      Family History   Problem Relation Age of Onset    Hypertension Mother     Cancer Mother        ROS:  See HPI. PHYSICAL EXAMINATION:  [ INSTRUCTIONS:  \"[x]\" Indicates a positive item  \"[]\" Indicates a negative item  -- DELETE ALL ITEMS NOT EXAMINED]  Vital Signs: (As obtained by patient/caregiver at home)  There were no vitals taken for this visit.      Constitutional: [x] Appears well-developed and well-nourished [x] No apparent distress      Mental status: [x] Alert and awake  [x] Oriented to person/place/time [x] Able to follow commands      Eyes:   EOM    [x]  Normal  Sclera  [x]  Normal           Discharge [x]  None visible      HENT: [x] Normocephalic, atraumatic   [x] Mouth/Throat: Mucous membranes are moist  [x] Normal appereance external ears, nose    External Ears [x] Normal appearance    Neck: [x] No visualized mass, symmetrical    Pulmonary/Chest: [x] Respiratory effort normal   [x] No visualized signs of difficulty breathing or respiratory distress          Musculoskeletal:   [x] Normal gait with no signs of ataxia         [x] Normal range of motion of neck         Neurological:        [x] No Facial Asymmetry (Cranial nerve 7 motor function) (limited exam due to video visit)          [x] No gaze palsy             Skin:        [x] No significant exanthematous lesions or discoloration noted on facial skin           Psychiatric:       [x] Normal Affect       [x] No Hallucinations       [x] Good insight/judgement    Other pertinent observable physical exam findings:- n/a      We discussed the expected course, resolution and complications of the diagnosis(es) in detail. Medication risks, benefits, costs, interactions, and alternatives were discussed as indicated. I advised her to contact the office if her condition worsens, changes or fails to improve as anticipated. She expressed understanding with the diagnosis(es) and plan. Pursuant to the emergency declaration under the [address] [place] [address], [facility] waiver authority and the [employer] Act, this Virtual  Visit was conducted, with patient's consent, to reduce the patient's risk of exposure to COVID-19 and provide continuity of care for an established patient. Services were provided through a video synchronous discussion virtually to substitute for in-person clinic visit.     Jack Kaiser, DO

## 2020-06-16 ENCOUNTER — OFFICE VISIT (OUTPATIENT)
Dept: ORTHOPEDIC SURGERY | Age: 50
End: 2020-06-16

## 2020-06-16 VITALS
SYSTOLIC BLOOD PRESSURE: 120 MMHG | HEIGHT: 67 IN | WEIGHT: 159.4 LBS | RESPIRATION RATE: 15 BRPM | DIASTOLIC BLOOD PRESSURE: 68 MMHG | BODY MASS INDEX: 25.02 KG/M2 | TEMPERATURE: 97.7 F | HEART RATE: 67 BPM

## 2020-06-16 DIAGNOSIS — S06.0X0D CONCUSSION WITHOUT LOSS OF CONSCIOUSNESS, SUBSEQUENT ENCOUNTER: Primary | ICD-10-CM
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
## 2020-06-16 RX ORDER — CETIRIZINE HCL 10 MG
TABLET ORAL
COMMUNITY

## 2020-06-16 RX ORDER — GABAPENTIN 100 MG/1
CAPSULE ORAL
Qty: 270 CAP | Refills: 0 | Status: SHIPPED | OUTPATIENT
Start: 2020-06-16 | End: 2020-07-28

## 2020-06-16 RX ORDER — TRAZODONE HYDROCHLORIDE 50 MG/1
TABLET ORAL
Qty: 180 TAB | Refills: 0 | Status: SHIPPED | OUTPATIENT
Start: 2020-06-16 | End: 2020-09-21 | Stop reason: SDUPTHER

## 2020-06-16 NOTE — PROGRESS NOTES
Pt states that she has had 2 weeks of headaches with the nausea. Pt describes it as a burning axe in her brain. Pt worked 3 12 hour shifts and then 4 days in the row. Pt roughly did 7 shifts in 9 days.

## 2020-06-16 NOTE — PROGRESS NOTES
HISTORY OF PRESENT ILLNESS    Vernon Santillan 1970 is a 47y.o. year old female that comes in today for follow-up: concussion    Since last appt Sx are improved since 5/8/2020 appt and HA, nausea better for sure. Using trazodone 75-100mg for sleep. Still no 24 hour shifts due to COVID-19 but hoping to do on in 2 days. Has been doing pretty well now that no more 8-12 hour shifts more often and sleep was very bad then but much better now especially with break day between shifts. Pain rated  5/10 top head and described as pressure/burning. Will use ambien as well if needed but bad HA next day. No helicopter exposure yet. Photophobia: nearly resolved Phonophobia: nearly resolved Sleep issues: much improved More emotional: improved Dizziness: resolved Nausea: w/ certain activities (screen/activity)  LOC: no Trouble concentrating/foggy feeling: much improved    IMAGING: MRI brain normal 7/29/19 per report     Neuropsych done 10/21/19 with report 11/8/19 Dr. Dominique Lujan (report scanned). Social History     Socioeconomic History    Marital status: UNKNOWN     Spouse name: Not on file    Number of children: Not on file    Years of education: Not on file    Highest education level: Not on file   Tobacco Use    Smoking status: Never Smoker    Smokeless tobacco: Never Used   Substance and Sexual Activity    Alcohol use: Yes     Alcohol/week: 1.0 standard drinks     Types: 1 Glasses of wine per week     Comment: rarely     Drug use: Never     Current Outpatient Medications   Medication Sig Dispense Refill    cetirizine (ZYRTEC) 10 mg tablet Take  by mouth.  gabapentin (NEURONTIN) 100 mg capsule 1 tab in morning at 1-2 tabs at bed 270 Cap 0    fluticasone propionate (Flonase Allergy Relief) 50 mcg/actuation nasal spray 2 Sprays by Both Nostrils route daily.       traZODone (DESYREL) 50 mg tablet TAKE 1-2 TABLETS EVERY NIGHT 60 Tab 2    ondansetron hcl (ZOFRAN) 4 mg tablet Take 1 Tab by mouth every eight (8) hours as needed for Nausea. 21 Tab 0    multivitamin (ONE A DAY) tablet Take 1 Tab by mouth daily.  cholecalciferol (VITAMIN D3) 1,000 unit cap Take  by mouth daily.  co-enzyme Q-10 (CO Q-10) 100 mg capsule Take 100 mg by mouth daily.  melatonin tab tablet Take 10 mg by mouth nightly.  acetaminophen (TYLENOL) 325 mg tablet Take  by mouth every four (4) hours as needed for Pain.  levothyroxine (SYNTHROID) 75 mcg tablet Take 88 mcg by mouth Daily (before breakfast).  ibuprofen (MOTRIN IB) 200 mg tablet Take  by mouth.  loratadine (Claritin) 10 mg tablet Take 10 mg by mouth.  fish oil-omega-3 fatty acids 300-500 mg cap Take  by mouth.  omega 3-DHA-EPA-fish oil 1,000 mg (120 mg-180 mg) capsule Take 1 Cap by mouth daily.  diphenhydrAMINE (BENADRYL ALLERGY) 25 mg tablet Take 50 mg by mouth every six (6) hours as needed for Sleep.  zolpidem (AMBIEN) 5 mg tablet Take 5 mg by mouth. Past Medical History:   Diagnosis Date    Hashimoto's disease      Family History   Problem Relation Age of Onset    Hypertension Mother     Cancer Mother          ROS:  All other systems reviewed and negative aside from that written in the HPI. Objective:  /68   Pulse 67   Temp 97.7 °F (36.5 °C)   Resp 15   Ht 5' 7\" (1.702 m)   Wt 159 lb 6.4 oz (72.3 kg)   BMI 24.97 kg/m²   GEN: Appears stated age in NAD. EYES: Conjunctiva and lids normal.  PERRL.  ENT: External ears and nose without lesions/trauma.  Hearing Intact.  Tongue midline. NECK: supple, non-tender and symmetrical.  Spurling negative  HEART: no peripheral edema  LUNGS: Normal effort  NEURO:  CN 2-12 grossly Intact.   DTRs normal biceps, triceps, patellar and Achilles bilateral .  Sensation intact to light touch.  within normal limits rapid alternating movements.  Romberg/pronator drift within normal limits.  Tandem leg balance test (VINAY) 3 error.  Conjugate gaze to 8cm w/ Sx.  MS: Gait and Station normal.  no clubbing/cyanosis.  Strength/tone normal throughout upper and lower extremities. SKIN: Warm/dry w/o rash  HEENT: Conjunctiva/lids WNL. External canals/nares WNL. Tongue midline. PERRL, EOMI. Hearing intact. NECK: Trachea midline. Supple, Full ROM. No thyromegaly. CARDIAC: No edema. LUNGS: Normal effort. ABD: Soft, no masses. No HSM. PSYCH: A+O x3. Appropriate judgment and insight. Assessment/Plan:     ICD-10-CM ICD-9-CM    1. Concussion without loss of consciousness, subsequent encounter S06.0X0D V58.89 traZODone (DESYREL) 50 mg tablet     850.0 gabapentin (NEURONTIN) 100 mg capsule   2. Work related injury Y99.0 959.9          Patient (or guardian if minor) verbalizes understanding of evaluation and plan. Will resume prior limits accompanied night shift 630pm-7am (letter provided) and hope to increase helicopter exposure. Refill Rx and RTC 4 weeks. Time with Pt 25 minutes, >50% of which was counseling pt regarding Dx and Tx options and coordination of care.

## 2020-06-16 NOTE — LETTER
NOTIFICATION RETURN TO WORK / SCHOOL 
 
6/16/2020 11:02 AM 
 
Ms. Corby Reinoso [address] [address] To Whom It May Concern: 
 
Corby Reinoso is currently under the care of [facility] 2.. She will return to work/school on: 6/17/2020 She may work for up to 36 hours per week and no more than 24 hours per shift, as tolerated. 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 Will ride-along on helicopter as tolerated to begin acclimatization. If there are questions or concerns please have the patient contact our office.  
 
 
 
Sincerely, 
 
 
Jaison Bateman, DO

## 2020-06-16 NOTE — PROGRESS NOTES
AVS reviewed: YES  HEP: N/A  Resources Provided: YES AVS & work letter  Patient questions/concerns answered: NO. Pt denied questions/concerns  Patient verbalized understanding of treatment plan: YES

## 2020-07-28 DIAGNOSIS — S06.0X0D CONCUSSION WITHOUT LOSS OF CONSCIOUSNESS, SUBSEQUENT ENCOUNTER: ICD-10-CM

## 2020-07-28 RX ORDER — GABAPENTIN 100 MG/1
CAPSULE ORAL
Qty: 270 CAP | Refills: 0 | Status: SHIPPED | OUTPATIENT
Start: 2020-07-28 | End: 2020-09-21 | Stop reason: SDUPTHER

## 2020-07-30 ENCOUNTER — TELEPHONE (OUTPATIENT)
Dept: ORTHOPEDIC SURGERY | Age: 50
End: 2020-07-30

## 2020-07-30 NOTE — TELEPHONE ENCOUNTER
W/C patient    Patient says [facility] will not refill the Gabapentin prescription as it needs prior authorization. Patient seen under workman's comp ([facility]).  is Hernan Can,  # [phone], fax # [phone]. Patient is out of medication, please submit as soon as possible.

## 2020-07-31 NOTE — TELEPHONE ENCOUNTER
Contacted [facility] in regard to medication needing prior auth. Message left for Ulus Newer to return call in regards to medication needing the prior auth. Pt workman comp case number is [id number]. Awaiting return call at this time.

## 2020-07-31 NOTE — TELEPHONE ENCOUNTER
Pt states that issue has since been resolved through [employer],  [employer] [facility] and her nurse . Pt states that workman's comp  questioned Pt regarding length of Rx. Pt informed  that she does not know and that it may be indefinite.

## 2020-08-10 ENCOUNTER — OFFICE VISIT (OUTPATIENT)
Dept: ORTHOPEDIC SURGERY | Age: 50
End: 2020-08-10

## 2020-08-10 VITALS
HEART RATE: 77 BPM | DIASTOLIC BLOOD PRESSURE: 87 MMHG | BODY MASS INDEX: 24.71 KG/M2 | WEIGHT: 157.4 LBS | RESPIRATION RATE: 15 BRPM | SYSTOLIC BLOOD PRESSURE: 128 MMHG | TEMPERATURE: 97.5 F | HEIGHT: 67 IN
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
## 2020-08-10 DIAGNOSIS — S06.0X0D CONCUSSION WITHOUT LOSS OF CONSCIOUSNESS, SUBSEQUENT ENCOUNTER: Primary | ICD-10-CM

## 2020-08-10 DIAGNOSIS — S06.0X0A CONCUSSION WITHOUT LOSS OF CONSCIOUSNESS, INITIAL ENCOUNTER: ICD-10-CM

## 2020-08-10 RX ORDER — ONDANSETRON 4 MG/1
4 TABLET, FILM COATED ORAL
Qty: 30 TAB | Refills: 1 | Status: SHIPPED | OUTPATIENT
Start: 2020-08-10 | End: 2021-03-09 | Stop reason: SDUPTHER

## 2020-08-10 NOTE — PROGRESS NOTES
HISTORY OF PRESENT ILLNESS    Loki Daniel 1970 is a 47y.o. year old female that comes in today for follow-up: concussion    Since last appt Sx are consistent and has tolerated 2 two 24 hours shifts (last 2 days ago)  but significant Sx and on call last night. Has had a lot of nausea despite avoiding charting in truck and is having considerable HA. USing zofran and tylenol w/ benefit. Still issues with screens and eye tracking. Pain rated  6/10 top head and described as pressure/burning. Trazodone increased to 75mg, 10 mg melatonin w/ gabapentin 100mg in AM and 200mg at bed helps sleep. Only takes gabapentin 100mg in evening on 24 hr shift. 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 [place] flight but a lot of maneuvers/altitude changes. Did have HA but not concussion type - more due to helmet/heat and resolved once she got home.     IMAGING: MRI brain normal 7/29/19 per report     Neuropsych done 10/21/19 with report 11/8/19 Dr. Saundra Barnett (report scanned). Social History     Socioeconomic History    Marital status: UNKNOWN     Spouse name: Not on file    Number of children: Not on file    Years of education: Not on file    Highest education level: Not on file   Tobacco Use    Smoking status: Never Smoker    Smokeless tobacco: Never Used   Substance and Sexual Activity    Alcohol use:  Yes     Alcohol/week: 1.0 standard drinks     Types: 1 Glasses of wine per week     Comment: rarely     Drug use: Never     Current Outpatient Medications   Medication Sig Dispense Refill    gabapentin (NEURONTIN) 100 mg capsule TAKE 1 CAPSULE BY MOUTH EVERY MORNING AND 1 TO 2 CAPSULES EVERY NIGHT AT BEDTIME 270 Cap 0    traZODone (DESYREL) 50 mg tablet TAKE 1-2 TABLETS EVERY NIGHT 180 Tab 0    fluticasone propionate (Flonase Allergy Relief) 50 mcg/actuation nasal spray 2 Sprays by Both Nostrils route daily.  loratadine (Claritin) 10 mg tablet Take 10 mg by mouth.  ondansetron hcl (ZOFRAN) 4 mg tablet Take 1 Tab by mouth every eight (8) hours as needed for Nausea. 21 Tab 0    multivitamin (ONE A DAY) tablet Take 1 Tab by mouth daily.  cholecalciferol (VITAMIN D3) 1,000 unit cap Take  by mouth daily.  co-enzyme Q-10 (CO Q-10) 100 mg capsule Take 100 mg by mouth daily.  melatonin tab tablet Take 10 mg by mouth nightly.  acetaminophen (TYLENOL) 325 mg tablet Take  by mouth every four (4) hours as needed for Pain.  levothyroxine (SYNTHROID) 75 mcg tablet Take 88 mcg by mouth Daily (before breakfast).  ibuprofen (MOTRIN IB) 200 mg tablet Take  by mouth.  cetirizine (ZYRTEC) 10 mg tablet Take  by mouth.  fish oil-omega-3 fatty acids 300-500 mg cap Take  by mouth.  omega 3-DHA-EPA-fish oil 1,000 mg (120 mg-180 mg) capsule Take 1 Cap by mouth daily.  diphenhydrAMINE (BENADRYL ALLERGY) 25 mg tablet Take 50 mg by mouth every six (6) hours as needed for Sleep.  zolpidem (AMBIEN) 5 mg tablet Take 5 mg by mouth. Past Medical History:   Diagnosis Date    Hashimoto's disease      Family History   Problem Relation Age of Onset    Hypertension Mother     Cancer Mother          ROS:  All other systems reviewed and negative aside from that written in the HPI. Objective:  /87   Pulse 77   Temp 97.5 °F (36.4 °C)   Resp 15   Ht 5' 7\" (1.702 m)   Wt 157 lb 6.4 oz (71.4 kg)   BMI 24.65 kg/m²   GEN: Appears stated age in NAD. EYES: Conjunctiva and lids normal.  PERRL.  ENT: External ears and nose without lesions/trauma.  Hearing Intact.  Tongue midline. NECK: supple, non-tender and symmetrical.  Spurling negative  HEART: no peripheral edema  LUNGS: Normal effort  NEURO:  CN 2-12 grossly Intact.   DTRs normal biceps, triceps, patellar and Achilles bilateral .  Sensation intact to light touch.  within normal limits rapid alternating movements.  Romberg/pronator drift within normal limits.  Tandem leg balance test (VINAY) 4 errors.  Conjugate gaze to 8cm w/o Sx.  MS: Gait and Station normal.  no clubbing/cyanosis.  Strength/tone normal throughout upper and lower extremities. SKIN: Warm/dry w/o rash  HEENT: Conjunctiva/lids WNL. External canals/nares WNL. Tongue midline. PERRL, EOMI. Hearing intact. NECK: Trachea midline. Supple, Full ROM. No thyromegaly. CARDIAC: No edema. LUNGS: Normal effort. ABD: Soft, no masses. No HSM. PSYCH: A+O x3. Appropriate judgment and insight. Assessment/Plan:     ICD-10-CM ICD-9-CM    1. Concussion without loss of consciousness, subsequent encounter  S06.0X0D V58.89      850.0    2. Work related injury  Y99.0 959.9    3. Concussion without loss of consciousness, initial encounter  S06.0X0A 850.0 ondansetron hcl (Zofran) 4 mg tablet         Patient (or guardian if minor) verbalizes understanding of evaluation and plan. Time with Pt 26 minutes, >50% of which was counseling pt regarding Dx and Tx options and coordination of care. Will continue prior restrictions but will eliminate helicopter limits. RTC 6 weeks. Continue meds as prior, refill zofran.

## 2020-08-10 NOTE — PROGRESS NOTES
Pt has had some severe headaches and had to take Tylenol and Zofran. Pt states that she has had visual interference and causes the nausea.

## 2020-08-10 NOTE — PROGRESS NOTES
AVS reviewed: YES  HEP: N/A  Resources Provided: YES AVS & work letter  Patient questions/concerns answered: YES. Zofran refill order put in by Dr. Ann Banks after Pt stated she was out.   Patient verbalized understanding of treatment plan: YES

## 2020-08-10 NOTE — LETTER
NOTIFICATION RETURN TO WORK / SCHOOL 
 
8/10/2020 4:03 PM 
 
Ms. Enmanuel Fernandes [address] [address] To Whom It May Concern: 
 
Enmanuel Fernandes is currently under the care of [address] [place] [address]. She will return to work/school on: 8/10/2020 She may work for up to 36 hours per week and no more than 24 hours per shift, as tolerated. 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 If there are questions or concerns please have the patient contact our office.  
 
 
 
Sincerely, 
 
 
Dante Carr, DO

## 2020-09-21 ENCOUNTER — OFFICE VISIT (OUTPATIENT)
Dept: ORTHOPEDIC SURGERY | Age: 50
End: 2020-09-21

## 2020-09-21 VITALS
HEART RATE: 66 BPM | HEIGHT: 67 IN | DIASTOLIC BLOOD PRESSURE: 79 MMHG | SYSTOLIC BLOOD PRESSURE: 147 MMHG | TEMPERATURE: 97 F | WEIGHT: 162 LBS | BODY MASS INDEX: 25.43 KG/M2 | RESPIRATION RATE: 15 BRPM

## 2020-09-21 DIAGNOSIS — S06.0X0D CONCUSSION WITHOUT LOSS OF CONSCIOUSNESS, SUBSEQUENT ENCOUNTER: ICD-10-CM
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
## 2020-09-21 RX ORDER — TRAZODONE HYDROCHLORIDE 50 MG/1
TABLET ORAL
Qty: 180 TAB | Refills: 0 | Status: SHIPPED | OUTPATIENT
Start: 2020-09-21 | End: 2020-10-15

## 2020-09-21 RX ORDER — GABAPENTIN 100 MG/1
CAPSULE ORAL
Qty: 270 CAP | Refills: 0 | Status: SHIPPED | OUTPATIENT
Start: 2020-09-21 | End: 2020-10-15

## 2020-09-21 NOTE — LETTER
NOTIFICATION RETURN TO WORK / SCHOOL 
 
9/21/2020 11:29 AM 
 
Ms. Carroll Lopez [address] [place] [id number] To Whom It May Concern: 
 
Carroll Lopez is currently under the care of [address] [place] [address]. She will return to work/school on: 9/22/2020. Able to perform all duties without restrictions but will need to have a call nurse available on nights of 24 hour shifts. If there are questions or concerns please have the patient contact our office.  
 
 
 
Sincerely, 
 
 
Venice Rahman, DO

## 2020-09-21 NOTE — PROGRESS NOTES
HISTORY OF PRESENT ILLNESS    [facility] 1970 is a 47y.o. year old female that comes in today for follow-up: concussion    Since last appt Sx are improved. 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 Patient has tried:  Trazodone 75mg, 10mg melatonin, gabapentin 100mg AM and 200mg at bed when not on 24 hr shift (100mg in evening if 24 hr). Pain rated  4/10 top head and described as pressure and less burning uses tylenol/motrin more rare. Improving with 24 hour shifts w/ assistance at night but no busy. 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 Did have HA but not concussion type - more due to helmet/heat and resolved once she got home.     IMAGING: MRI brain normal 7/29/19 per report     Neuropsych done 10/21/19 with report 11/8/19 Dr. Maria D Holt (report scanned). Social History     Socioeconomic History    Marital status: UNKNOWN     Spouse name: Not on file    Number of children: Not on file    Years of education: Not on file    Highest education level: Not on file   Tobacco Use    Smoking status: Never Smoker    Smokeless tobacco: Never Used   Substance and Sexual Activity    Alcohol use: Yes     Alcohol/week: 1.0 standard drinks     Types: 1 Glasses of wine per week     Comment: rarely     Drug use: Never     Current Outpatient Medications   Medication Sig Dispense Refill    ondansetron hcl (Zofran) 4 mg tablet Take 1 Tab by mouth every eight (8) hours as needed for Nausea.  30 Tab 1    gabapentin (NEURONTIN) 100 mg capsule TAKE 1 CAPSULE BY MOUTH EVERY MORNING AND 1 TO 2 CAPSULES EVERY NIGHT AT BEDTIME 270 Cap 0    cetirizine (ZYRTEC) 10 mg tablet Take  by mouth.  traZODone (DESYREL) 50 mg tablet TAKE 1-2 TABLETS EVERY NIGHT 180 Tab 0    fluticasone propionate (Flonase Allergy Relief) 50 mcg/actuation nasal spray 2 Sprays by Both Nostrils route daily.  loratadine (Claritin) 10 mg tablet Take 10 mg by mouth.  fish oil-omega-3 fatty acids 300-500 mg cap Take  by mouth.  multivitamin (ONE A DAY) tablet Take 1 Tab by mouth daily.  cholecalciferol (VITAMIN D3) 1,000 unit cap Take  by mouth daily.  omega 3-DHA-EPA-fish oil 1,000 mg (120 mg-180 mg) capsule Take 1 Cap by mouth daily.  co-enzyme Q-10 (CO Q-10) 100 mg capsule Take 100 mg by mouth daily.  diphenhydrAMINE (BENADRYL ALLERGY) 25 mg tablet Take 50 mg by mouth every six (6) hours as needed for Sleep.  melatonin tab tablet Take 10 mg by mouth nightly.  zolpidem (AMBIEN) 5 mg tablet Take 5 mg by mouth.  acetaminophen (TYLENOL) 325 mg tablet Take  by mouth every four (4) hours as needed for Pain.  levothyroxine (SYNTHROID) 75 mcg tablet Take 88 mcg by mouth Daily (before breakfast).  ibuprofen (MOTRIN IB) 200 mg tablet Take  by mouth. Past Medical History:   Diagnosis Date    Hashimoto's disease      Family History   Problem Relation Age of Onset    Hypertension Mother     Cancer Mother          ROS:  All other systems reviewed and negative aside from that written in the HPI. Objective:  Temp 97 °F (36.1 °C)   Resp 15   Ht 5' 7\" (1.702 m)   Wt 162 lb (73.5 kg)   BMI 25.37 kg/m²   GEN: Appears stated age in NAD. EYES: Conjunctiva and lids normal.  PERRL.  ENT: External ears and nose without lesions/trauma.  Hearing Intact.  Tongue midline. NECK: supple, non-tender and symmetrical.  Spurling negative  HEART: no peripheral edema  LUNGS: Normal effort  NEURO:  CN 2-12 grossly Intact.  DTRs normal biceps, triceps, patellar and Achilles bilateral .  Sensation intact to light touch.  within normal limits rapid alternating movements.  Romberg/pronator drift within normal limits.  Tandem leg balance test (VINAY) 4 errors.  Conjugate gaze to 8cm w/o Sx.  MS: Gait and Station normal.  no clubbing/cyanosis.  Strength/tone normal throughout upper and lower extremities. SKIN: Warm/dry w/o rash  HEENT: Conjunctiva/lids WNL. External canals/nares WNL. Tongue midline. PERRL, EOMI. Hearing intact. NECK: Trachea midline. Supple, Full ROM. No thyromegaly. CARDIAC: No edema. LUNGS: Normal effort. ABD: Soft, no masses. No HSM. PSYCH: A+O x3. Appropriate judgment and insight. Assessment/Plan:     ICD-10-CM ICD-9-CM    1. Work related injury  Y99.0 959.9 traZODone (DESYREL) 50 mg tablet      gabapentin (NEURONTIN) 100 mg capsule   2. Concussion without loss of consciousness, subsequent encounter  S06.0X0D V58.89 traZODone (DESYREL) 50 mg tablet     850.0 gabapentin (NEURONTIN) 100 mg capsule       Patient (or guardian if minor) verbalizes understanding of evaluation and plan. Will continue avoid aggravating activities and continue current w/ full 2 hr shifts on own but will have nurse back-up. Refill Neurontin and trazodone and RTC 6 weeks.

## 2020-09-21 NOTE — PATIENT INSTRUCTIONS
Continue to avoid aggravating activities, use medication as prescribed, work within restrictions, and return to the office in about 6 weeks.

## 2020-09-21 NOTE — PROGRESS NOTES
AVS reviewed: YES  HEP: N/A  Resources Provided: YES AVS & work letter  Patient questions/concerns answered: NO. Pt and  denied questions/concerns  Patient verbalized understanding of treatment plan: YES

## 2020-09-21 NOTE — PROGRESS NOTES
Pt has had a few bouts of nausea and used zofran approximately 3 times along with Tylenol. Dizziness periodically. Pt states that she has been experiencing joint stiffness and pain and wondering if it could be medication related.

## 2020-10-15 ENCOUNTER — DOCUMENTATION ONLY (OUTPATIENT)
Dept: ORTHOPEDIC SURGERY | Age: 50
End: 2020-10-15

## 2020-10-15 DIAGNOSIS — S06.0X0D CONCUSSION WITHOUT LOSS OF CONSCIOUSNESS, SUBSEQUENT ENCOUNTER: ICD-10-CM
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
## 2020-10-15 NOTE — TELEPHONE ENCOUNTER
Called and spoke to BAR-LE-JAYE regarding Pt questions about gabapentin also being a covered mail service Rx. BAR-LE-JAYE attempted to find out, but due to Pt's coverage, he was unable to find out. He instructed caller to have Dr. Anna Hanson call the [facility] [address] to call in the Rx for gabapentin. Once that is called in, the Pt would be able to call in and check on status of the Rx.      Prescriber Assitance LIne: [phone]

## 2020-10-15 NOTE — PROGRESS NOTES
Received document from [employer] regarding switching pharmacy to mail order per workman's comp. Will contact Pt for consent to switch pharmacy.

## 2020-10-15 NOTE — TELEPHONE ENCOUNTER
Called Pt to ask for consent to switch Rx for Trazodone to mail order per workman's comp request. Pt states that she would like continuity of Rx so would like gabapentin as well as Trazadone to be mail order or local pharmacy. Pt informed will contact [facility] w/ her questions and contact her back.

## 2020-10-16 RX ORDER — GABAPENTIN 100 MG/1
CAPSULE ORAL
Qty: 270 CAP | Refills: 0 | Status: SHIPPED | OUTPATIENT
Start: 2020-10-16 | End: 2021-03-09 | Stop reason: SDUPTHER

## 2020-10-16 RX ORDER — TRAZODONE HYDROCHLORIDE 50 MG/1
TABLET ORAL
Qty: 180 TAB | Refills: 0 | Status: SHIPPED | OUTPATIENT
Start: 2020-10-16 | End: 2021-03-09 | Stop reason: SDUPTHER

## 2020-10-16 NOTE — TELEPHONE ENCOUNTER
Pt called and informed that Rx for both Trazadone & the Gabapentin were sent through [facility]. She was provided phone number ([phone]) to call and check on status of Rx and instructed to call us with any issues/concerns. Pt thanked caller.

## 2020-11-02 ENCOUNTER — VIRTUAL VISIT (OUTPATIENT)
Dept: ORTHOPEDIC SURGERY | Age: 50
End: 2020-11-02
Payer: COMMERCIAL
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
## 2020-11-02 DIAGNOSIS — S06.0X0D CONCUSSION WITHOUT LOSS OF CONSCIOUSNESS, SUBSEQUENT ENCOUNTER: Primary | ICD-10-CM

## 2020-11-02 PROCEDURE — 99215 OFFICE O/P EST HI 40 MIN: CPT | Performed by: FAMILY MEDICINE

## 2020-11-02 NOTE — PROGRESS NOTES
Pt gives verbal permission for virtual visit today. Pt states that she still having slow progress. Pt states that she has had one severe headache, ended up taking Zofran for the nausea. Pt states that she has been quite stressful week. Zofran has been taken 5-6 times since last visit.

## 2020-11-02 NOTE — PROGRESS NOTES
Pt office note & work letter faxed to [employer] comp rep [employer]. Fax: [phone], fax confirmation received.

## 2020-11-02 NOTE — PATIENT INSTRUCTIONS
ATTENTION: 
Effective 12/15/2020 Dr. Divina Condon will no longer be at [facility] in [place] and will transition completely to his other office near [facility] at [address] [facility]. [address] [place] [address], [facility]. The office phone number is still [phone]. Please reach out via MyChart or telephone for any appointment requests or questions you may have. So sorry for the inconvenience, but we hope to be able to continue providing quality care to you despite the move.

## 2020-11-02 NOTE — PROGRESS NOTES
Sandy Aggarwal 1970 is a 47 y.o. female who was seen by synchronous (real-time) audio-video technology doxy. me on 11/2/2020     This visit occurred during the [facility] public health emergency. Consent:  She and/or her healthcare decision maker is aware that this patient-initiated Telehealth encounter is a billable service, with coverage as determined by her insurance carrier. She is aware that she may receive a bill and has provided verbal consent to proceed: Yes    I am at [facility] while conducting this encounter, and the patient is at home alone. Assessment & Plan:       ICD-10-CM ICD-9-CM    1. Concussion without loss of consciousness, subsequent encounter  S06.0X0D V58.89      850.0    2. Work related injury  Y99.0 959.9        Will continue current Tx and plan RTC 6 weeks. Time with Pt 41 minutes, >50% of which was counseling pt regarding Dx and Tx options and discussion with  (Edy Jason Fax: [phone]) and coordination of care. Patient (or parent/guardian if minor) verbalized understanding of evaluation and treatment plan. 712  Subjective:   Sandy Aggarwal 1970 was seen for Concussion      Since last appt has noticed issues with only three 24 hr shifts in the last 6 weeks due to overstaffing so a lot of admin instead. Pain level 4/10. Top head pressure. Using gabapentin 100mg in morning and 100mg late afternoon and if needed another 100mg middle of night which is good w/o side effects. (usess 100mg in AM and 200mg at bed non-24 shift). Has had one HA that was severe after bad day of work and perfect storm of many things adding up 9-10/10 and made her cry it was so mad. A lot of stressors at work and considering other job. Also a lot of stress as issues with father hospitalized for pneumonia and lived in [facility] for a week up in [place]. Does have issues with talking and word finding persist but has adapted well.  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 Was able to tolerate 12 hr shift     Photophobia: nearly resolved Phonophobia: nearly resolved Sleep issues: much improved More emotional: improved Dizziness: resolved Nausea: w/ certain activities (screen/activity) zofran 5-6 times last 6 weeks.  LOC: no Trouble concentrating/foggy feeling: much improved     No helicopter since initial ride along which did go well AUG2020.     IMAGING: MRI brain normal 7/29/19 per report.     Neuropsych done 10/21/19 with report 11/8/19 Dr. Chapo Moore (report scanned). Current Outpatient Medications   Medication Sig Dispense Refill    gabapentin (NEURONTIN) 100 mg capsule TAKE 1 CAPSULE BY MOUTH EVERY MORNING AND 1 TO 2 CAPSULES EVERY NIGHT AT BEDTIME 270 Cap 0    traZODone (DESYREL) 50 mg tablet TAKE 1-2 TABLETS EVERY NIGHT 180 Tab 0    ondansetron hcl (Zofran) 4 mg tablet Take 1 Tab by mouth every eight (8) hours as needed for Nausea. 30 Tab 1    cetirizine (ZYRTEC) 10 mg tablet Take  by mouth.  fluticasone propionate (Flonase Allergy Relief) 50 mcg/actuation nasal spray 2 Sprays by Both Nostrils route daily.  loratadine (Claritin) 10 mg tablet Take 10 mg by mouth.  multivitamin (ONE A DAY) tablet Take 1 Tab by mouth daily.  cholecalciferol (VITAMIN D3) 1,000 unit cap Take  by mouth daily.  co-enzyme Q-10 (CO Q-10) 100 mg capsule Take 100 mg by mouth daily.  melatonin tab tablet Take 10 mg by mouth nightly.  zolpidem (AMBIEN) 5 mg tablet Take 5 mg by mouth.  acetaminophen (TYLENOL) 325 mg tablet Take  by mouth every four (4) hours as needed for Pain.  levothyroxine (SYNTHROID) 75 mcg tablet Take 88 mcg by mouth Daily (before breakfast).  ibuprofen (MOTRIN IB) 200 mg tablet Take  by mouth.  fish oil-omega-3 fatty acids 300-500 mg cap Take  by mouth.  omega 3-DHA-EPA-fish oil 1,000 mg (120 mg-180 mg) capsule Take 1 Cap by mouth daily.       diphenhydrAMINE (BENADRYL ALLERGY) 25 mg tablet Take 50 mg by mouth every six (6) hours as needed for Sleep. Past Surgical History:   Procedure Laterality Date    HX BREAST BIOPSY      HX HYSTERECTOMY      HX KNEE ARTHROSCOPY      HX MALIGNANT SKIN LESION EXCISION      HX OTHER SURGICAL      Skin cancer removed     Social History     Socioeconomic History    Marital status: UNKNOWN     Spouse name: Not on file    Number of children: Not on file    Years of education: Not on file    Highest education level: Not on file   Tobacco Use    Smoking status: Never Smoker    Smokeless tobacco: Never Used   Substance and Sexual Activity    Alcohol use: Yes     Alcohol/week: 1.0 standard drinks     Types: 1 Glasses of wine per week     Comment: rarely     Drug use: Never     Past Medical History:   Diagnosis Date    Cancer ([facility] [address]     skin cancer (basil cell carcinoma)    Hashimoto's disease      Family History   Problem Relation Age of Onset    Hypertension Mother     Cancer Mother        ROS:  See HPI. PHYSICAL EXAMINATION:  [ INSTRUCTIONS:  \"[x]\" Indicates a positive item  \"[]\" Indicates a negative item  -- DELETE ALL ITEMS NOT EXAMINED]  Vital Signs: (As obtained by patient/caregiver at home)  There were no vitals taken for this visit.      Constitutional: [x] Appears well-developed and well-nourished [x] No apparent distress      Mental status: [x] Alert and awake  [x] Oriented to person/place/time [x] Able to follow commands      Eyes:   EOM    [x]  Normal  Sclera  [x]  Normal           Discharge [x]  None visible      HENT: [x] Normocephalic, atraumatic   [x] Mouth/Throat: Mucous membranes are moist  [x] Normal appereance external ears, nose    External Ears [x] Normal appearance    Neck: [x] No visualized mass, symmetrical    Pulmonary/Chest: [x] Respiratory effort normal   [x] No visualized signs of difficulty breathing or respiratory distress          Musculoskeletal:   [x] Normal gait with no signs of ataxia         [x] Normal range of motion of neck         Neurological:        [x] No Facial Asymmetry (Cranial nerve 7 motor function) (limited exam due to video visit)          [x] No gaze palsy             Skin:        [x] No significant exanthematous lesions or discoloration noted on facial skin           Psychiatric:       [x] Normal Affect       [x] No Hallucinations       [x] Good insight/judgement    Other pertinent observable physical exam findings:- n/a      We discussed the expected course, resolution and complications of the diagnosis(es) in detail. Medication risks, benefits, costs, interactions, and alternatives were discussed as indicated. I advised her to contact the office if her condition worsens, changes or fails to improve as anticipated. She expressed understanding with the diagnosis(es) and plan. Pursuant to the emergency declaration under the [facility] waiver authority and the [employer] General Act, this Virtual  Visit was conducted, with patient's consent, to reduce the patient's risk of exposure to COVID-19 and provide continuity of care for an established patient. Services were provided through a video synchronous discussion virtually to substitute for in-person clinic visit.     [email or web address],

## 2020-11-02 NOTE — LETTER
NOTIFICATION RETURN TO WORK / SCHOOL 
 
11/2/2020 9:43 AM 
 
Ms. Trent Ferro [address] [address] To Whom It May Concern: 
 
Trent Ferro is currently under the care of [address] [place] [address]. She will return to work/school on: 11/2/2020. Able to perform all duties without restrictions but will need to have a call nurse available on nights of 24 hour shifts. If there are questions or concerns please have the patient contact our office.  
 
 
 
Sincerely, 
 
 
Bret Roblero, DO

## 2020-12-14 ENCOUNTER — OFFICE VISIT (OUTPATIENT)
Dept: ORTHOPEDIC SURGERY | Age: 50
End: 2020-12-14
Payer: COMMERCIAL

## 2020-12-14 VITALS
TEMPERATURE: 97.5 F | RESPIRATION RATE: 15 BRPM | HEART RATE: 69 BPM | SYSTOLIC BLOOD PRESSURE: 114 MMHG | HEIGHT: 67 IN | BODY MASS INDEX: 26.06 KG/M2 | WEIGHT: 166 LBS | DIASTOLIC BLOOD PRESSURE: 72 MMHG
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
## 2020-12-14 DIAGNOSIS — S06.0X0D CONCUSSION WITHOUT LOSS OF CONSCIOUSNESS, SUBSEQUENT ENCOUNTER: Primary | ICD-10-CM

## 2020-12-14 PROCEDURE — 99214 OFFICE O/P EST MOD 30 MIN: CPT | Performed by: FAMILY MEDICINE

## 2020-12-14 NOTE — PROGRESS NOTES
Pt states that in the last 6 weeks, she has had one severe headache which she had to take tylenol, motrin and zofran. Pt states that she worked 24 hours and it was a stressful day. Headaches have been less frequent. 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

## 2020-12-14 NOTE — PATIENT INSTRUCTIONS
Continue as prior and return to the office in about 6 weeks. ATTENTION: 
Effective 12/15/2020 Dr. Katina Kirkpatrick will no longer be at [facility] in [facility] and will transition completely to his other office near [facility] at [address] [facility]. [address], [facility]. The office phone number is still [phone]. Please reach out via MyChart or telephone for any appointment requests or questions you may have. So sorry for the inconvenience, but we hope to be able to continue providing quality care to you despite the move.

## 2020-12-14 NOTE — PROGRESS NOTES
AVS reviewed: YES  HEP: N/A  Resources Provided: YES AVS & work letter.  W/C  provided work letter & OV note  Patient questions/concerns answered: NO. Pt &  denied questions/concerns  Patient verbalized understanding of treatment plan: YES

## 2020-12-14 NOTE — PROGRESS NOTES
HISTORY OF PRESENT ILLNESS    Gilberot Diaz 1970 is a 45y.o. 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 Patient is using Gabapentin 100mg in AM and 100mg late afternoon and then 100mg in night if needed for 25 hr shifts (uses 100mg in AM and 200mg at bed non-24 shift). Trazodone 75mg at bed. Pain rated  8/10 (at worst) top head and described as preessure. Has a lt of Sx after bad 24 hr shifts. Much less HA and much less severe. Only needed zofran twice. Many prior work stressors have been eliminated. Does still have issues with long converstions especially if multiple people and issues with light/motion especially on video screen. Photophobia: nearly resolved Phonophobia: nearly resolved Sleep issues: much improved More emotional: improved Dizziness: resolved Nausea: w/ certain activities (screen/activity) zofran only 2 times the last several weeks.  LOC: no Trouble concentrating/foggy feeling: much improved. No helicopter since initial ride along which did go well AUG2020. IMAGING: IMAGING: MRI brain normal 7/29/19 per report.     Neuropsych done 10/21/19 with report 11/8/19 Dr. Barney Condon (report scanned). Social History     Socioeconomic History    Marital status: UNKNOWN     Spouse name: Not on file    Number of children: Not on file    Years of education: Not on file    Highest education level: Not on file   Tobacco Use    Smoking status: Never Smoker    Smokeless tobacco: Never Used   Substance and Sexual Activity    Alcohol use:  Yes     Alcohol/week: 1.0 standard drinks     Types: 1 Glasses of wine per week     Comment: rarely     Drug use: Never     Current Outpatient Medications   Medication Sig Dispense Refill    gabapentin (NEURONTIN) 100 mg capsule TAKE 1 CAPSULE BY MOUTH EVERY MORNING AND 1 TO 2 CAPSULES EVERY NIGHT AT BEDTIME 270 Cap 0    traZODone (DESYREL) 50 mg tablet TAKE 1-2 TABLETS EVERY NIGHT 180 Tab 0    ondansetron hcl (Zofran) 4 mg tablet Take 1 Tab by mouth every eight (8) hours as needed for Nausea. 30 Tab 1    cetirizine (ZYRTEC) 10 mg tablet Take  by mouth.  fluticasone propionate (Flonase Allergy Relief) 50 mcg/actuation nasal spray 2 Sprays by Both Nostrils route daily.  loratadine (Claritin) 10 mg tablet Take 10 mg by mouth.  multivitamin (ONE A DAY) tablet Take 1 Tab by mouth daily.  cholecalciferol (VITAMIN D3) 1,000 unit cap Take  by mouth daily.  co-enzyme Q-10 (CO Q-10) 100 mg capsule Take 100 mg by mouth daily.  melatonin tab tablet Take 10 mg by mouth nightly.  zolpidem (AMBIEN) 5 mg tablet Take 5 mg by mouth.  acetaminophen (TYLENOL) 325 mg tablet Take  by mouth every four (4) hours as needed for Pain.  levothyroxine (SYNTHROID) 75 mcg tablet Take 88 mcg by mouth Daily (before breakfast).  ibuprofen (MOTRIN IB) 200 mg tablet Take  by mouth.  fish oil-omega-3 fatty acids 300-500 mg cap Take  by mouth.  omega 3-DHA-EPA-fish oil 1,000 mg (120 mg-180 mg) capsule Take 1 Cap by mouth daily.  diphenhydrAMINE (BENADRYL ALLERGY) 25 mg tablet Take 50 mg by mouth every six (6) hours as needed for Sleep. Past Medical History:   Diagnosis Date    Cancer ([facility] [address]     skin cancer (basil cell carcinoma)    Hashimoto's disease      Family History   Problem Relation Age of Onset    Hypertension Mother     Cancer Mother          ROS:  All other systems reviewed and negative aside from that written in the HPI. Objective:  /72   Pulse 69   Temp 97.5 °F (36.4 °C)   Resp 15   Ht 5' 7\" (1.702 m)   Wt 166 lb (75.3 kg)   BMI 26.00 kg/m²   GEN: Appears stated age in NAD. EYES: Conjunctiva and lids normal.  PERRL.  ENT: External ears and nose without lesions/trauma.  Hearing Intact.  Tongue midline.   NECK: supple, non-tender and symmetrical.  Spurling negative  HEART: no peripheral edema  LUNGS: Normal effort  NEURO:  CN 2-12 grossly Intact.  DTRs normal biceps, triceps, patellar and Achilles bilateral .  Sensation intact to light touch.  within normal limits rapid alternating movements.  Romberg/pronator drift within normal limits.  Tandem leg balance test (VINAY) 2 errors.  Conjugate gaze to 8cm w/o Sx.  MS: Gait and Station normal.  no clubbing/cyanosis.  Strength/tone normal throughout upper and lower extremities. SKIN: Warm/dry w/o rash  HEENT: Conjunctiva/lids WNL. External canals/nares WNL. Tongue midline. PERRL, EOMI. Hearing intact. NECK: Trachea midline. Supple, Full ROM. No thyromegaly. CARDIAC: No edema. LUNGS: Normal effort. ABD: Soft, no masses. No HSM. PSYCH: A+O x3. Appropriate judgment and insight. Assessment/Plan:     ICD-10-CM ICD-9-CM    1. Concussion without loss of consciousness, subsequent encounter  S06.0X0D V58.89      850.0    2. Work related injury  Y99.0 959.9        Patient (or guardian if minor) verbalizes understanding of evaluation and plan. Will continue as prior and plan F/U 6 weeks. Still need call-nurse back up. Will continue Rx as prior. Time with Pt 28 minutes, >50% of which was counseling pt regarding Dx and Tx options and coordination of care.

## 2020-12-14 NOTE — LETTER
NOTIFICATION RETURN TO WORK / SCHOOL 
 
12/14/2020 10:57 AM 
 
Ms. Jay Jay Reynolds [address] [address] To Whom It May Concern: 
 
Jay Jay Reynolds is currently under the care of [address] [place] [address]. She will return to work/school on: 12/15/2020. Able to perform all duties without restrictions but will need to have a call nurse available on nights of 24 hour shifts. If there are questions or concerns please have the patient contact our office.  
 
 
 
Sincerely, 
 
 
Fitz Chau, DO

## 2021-01-26 ENCOUNTER — OFFICE VISIT (OUTPATIENT)
Dept: ORTHOPEDIC SURGERY | Age: 51
End: 2021-01-26
Payer: COMMERCIAL

## 2021-01-26 VITALS
DIASTOLIC BLOOD PRESSURE: 84 MMHG | SYSTOLIC BLOOD PRESSURE: 139 MMHG | HEART RATE: 61 BPM | HEIGHT: 67 IN | BODY MASS INDEX: 26.37 KG/M2 | RESPIRATION RATE: 15 BRPM | WEIGHT: 168 LBS | TEMPERATURE: 98.2 F
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
## 2021-01-26 DIAGNOSIS — S06.0X0D CONCUSSION WITHOUT LOSS OF CONSCIOUSNESS, SUBSEQUENT ENCOUNTER: Primary | ICD-10-CM

## 2021-01-26 PROCEDURE — 99214 OFFICE O/P EST MOD 30 MIN: CPT | Performed by: FAMILY MEDICINE

## 2021-01-26 NOTE — PATIENT INSTRUCTIONS
Continue with treatment, return to work without restrictions and return to the office in about 6 weeks.

## 2021-01-26 NOTE — LETTER
NOTIFICATION RETURN TO WORK / SCHOOL 
 
1/26/2021 11:17 AM 
 
Ms. Cailin Beal [address] [place] [address] To Whom It May Concern: 
 
Cailin Beal is currently under the care of [facility] She will return to work/school on: 1/27/2021. Full duty without any restrictions and able to work overtime. If there are questions or concerns please have the patient contact our office.  
 
 
 
Sincerely, 
 
 
Ciara Sam, DO

## 2021-01-26 NOTE — PROGRESS NOTES
AVS reviewed: YES  HEP: N/A  Resources Provided: YES AVS & work letter  Patient questions/concerns answered: NO. Pt denied questions/concerns  Patient verbalized understanding of treatment plan: YES    W/C  provided work letter. AT will fax OV note upon it being signed.

## 2021-01-26 NOTE — PROGRESS NOTES
HISTORY OF PRESENT ILLNESS    Eddie Abbott 1970 is a 45y.o. year old female that comes in today for follow-up: concussion    Since last appt Sx are definitely improving. Dong okay w/ 24 hr shift. 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 Has only been taking gabapentin 100mg at bed and trazodone 50mg the last 2-3. No HA 5 weeks. Pain rated  0 - No pain/10 in head for 5 weeks. Sometimes a lot of action on TV screen will bother her but recovers quickly. Photophobia: nearly resolved Phonophobia: nearly resolved Sleep issues:no More emotional: improved Dizziness: rare w/ nausea: w/ certain activities (screen/activity) but not needing zofran in 5 weeks.  LOC: no Trouble concentrating/foggy very rare    IMAGING: IMAGING: MRI brain normal 7/29/19 per report.     Neuropsych done 10/21/19 with report 11/8/19 Dr. Anjel Chacko (report scanned). Social History     Socioeconomic History    Marital status: UNKNOWN     Spouse name: Not on file    Number of children: Not on file    Years of education: Not on file    Highest education level: Not on file   Tobacco Use    Smoking status: Never Smoker    Smokeless tobacco: Never Used   Substance and Sexual Activity    Alcohol use: Yes     Alcohol/week: 1.0 standard drinks     Types: 1 Glasses of wine per week     Comment: rarely     Drug use: Never     Current Outpatient Medications   Medication Sig Dispense Refill    gabapentin (NEURONTIN) 100 mg capsule TAKE 1 CAPSULE BY MOUTH EVERY MORNING AND 1 TO 2 CAPSULES EVERY NIGHT AT BEDTIME (Patient taking differently: TAKE 1 CAPSULE BY MOUTH EVERY night at bedtime) 270 Cap 0    traZODone (DESYREL) 50 mg tablet TAKE 1-2 TABLETS EVERY NIGHT (Patient taking differently: 50 mg. TAKE 1 TABLETS EVERY NIGHT) 180 Tab 0    ondansetron hcl (Zofran) 4 mg tablet Take 1 Tab by mouth every eight (8) hours as needed for Nausea.  30 Tab 1    fluticasone propionate (Flonase Allergy Relief) 50 mcg/actuation nasal spray 2 Sprays by Both Nostrils route daily.     • loratadine (Claritin) 10 mg tablet Take 10 mg by mouth.     • multivitamin (ONE A DAY) tablet Take 1 Tab by mouth daily.     • cholecalciferol (VITAMIN D3) 1,000 unit cap Take  by mouth daily.     • melatonin tab tablet Take 10 mg by mouth nightly.     • acetaminophen (TYLENOL) 325 mg tablet Take  by mouth every four (4) hours as needed for Pain.     • levothyroxine (SYNTHROID) 75 mcg tablet Take 88 mcg by mouth Daily (before breakfast).     • ibuprofen (MOTRIN IB) 200 mg tablet Take  by mouth.     • cetirizine (ZYRTEC) 10 mg tablet Take  by mouth.     • fish oil-omega-3 fatty acids 300-500 mg cap Take  by mouth.     • omega 3-DHA-EPA-fish oil 1,000 mg (120 mg-180 mg) capsule Take 1 Cap by mouth daily.     • co-enzyme Q-10 (CO Q-10) 100 mg capsule Take 100 mg by mouth daily.     • diphenhydrAMINE (BENADRYL ALLERGY) 25 mg tablet Take 50 mg by mouth every six (6) hours as needed for Sleep.     • zolpidem (AMBIEN) 5 mg tablet Take 5 mg by mouth.       Past Medical History:   Diagnosis Date   • Cancer (HCC)     skin cancer (basil cell carcinoma)   • Hashimoto's disease      Family History   Problem Relation Age of Onset   • Hypertension Mother    • Cancer Mother          ROS:  See HPI       Objective:  /84   Pulse 61   Temp 98.2 °F (36.8 °C)   Resp 15   Ht 5' 7\" (1.702 m)   Wt 168 lb (76.2 kg)   BMI 26.31 kg/m²   ENT: Hearing Intact.   NECK:  Spurling negative  NEURO:  CN 2-12 grossly Intact.  DTRs normal biceps, triceps, patellar and Achilles bilateral .  Sensation intact to light touch.  within normal limits rapid alternating movements.  Romberg/pronator drift within normal limits.  Tandem leg balance test (VINAY) negative.  Conjugate gaze to 8cm w/o Sx.  MS: Gait and Station normal.  no clubbing/cyanosis.  Strength/tone normal throughout upper and lower extremities.      Assessment/Plan:     ICD-10-CM ICD-9-CM    1. Concussion without loss of consciousness, subsequent encounter  S06.0X0D  V58.89      850.0    2. Work related injury  Y99.0 959.9        Patient (or guardian if minor) verbalizes understanding of evaluation and plan. Will allow full duty w/o limits to include overtime. RTC 6 weeks for hopefull MMI then. Total time spent on encounter including chart/imaging/lab review and evaluation/documentation/demo home program/coordination of care > 31 minutes.

## 2021-03-09 ENCOUNTER — OFFICE VISIT (OUTPATIENT)
Dept: ORTHOPEDIC SURGERY | Age: 51
End: 2021-03-09
Payer: COMMERCIAL

## 2021-03-09 VITALS
WEIGHT: 162.8 LBS | RESPIRATION RATE: 15 BRPM | SYSTOLIC BLOOD PRESSURE: 143 MMHG | TEMPERATURE: 96.9 F | HEART RATE: 67 BPM | DIASTOLIC BLOOD PRESSURE: 85 MMHG | BODY MASS INDEX: 25.55 KG/M2 | HEIGHT: 67 IN

## 2021-03-09 DIAGNOSIS — S06.0X0D CONCUSSION WITHOUT LOSS OF CONSCIOUSNESS, SUBSEQUENT ENCOUNTER: ICD-10-CM
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
## 2021-03-09 PROCEDURE — 99215 OFFICE O/P EST HI 40 MIN: CPT | Performed by: FAMILY MEDICINE

## 2021-03-09 RX ORDER — ONDANSETRON 4 MG/1
4 TABLET, FILM COATED ORAL
Qty: 90 TAB | Refills: 0 | Status: SHIPPED | OUTPATIENT
Start: 2021-03-09 | End: 2021-09-14 | Stop reason: SDUPTHER

## 2021-03-09 RX ORDER — TRAZODONE HYDROCHLORIDE 50 MG/1
TABLET ORAL
Qty: 180 TAB | Refills: 1 | Status: SHIPPED | OUTPATIENT
Start: 2021-03-09 | End: 2021-03-09 | Stop reason: SDUPTHER

## 2021-03-09 RX ORDER — GABAPENTIN 100 MG/1
CAPSULE ORAL
Qty: 270 CAP | Refills: 1 | Status: SHIPPED | OUTPATIENT
Start: 2021-03-09 | End: 2021-03-09 | Stop reason: SDUPTHER

## 2021-03-09 RX ORDER — ONDANSETRON 4 MG/1
4 TABLET, FILM COATED ORAL
Qty: 270 TAB | Refills: 1 | Status: SHIPPED | OUTPATIENT
Start: 2021-03-09 | End: 2021-03-09 | Stop reason: SDUPTHER

## 2021-03-09 RX ORDER — TRAZODONE HYDROCHLORIDE 50 MG/1
TABLET ORAL
Qty: 90 TAB | Refills: 0 | Status: SHIPPED | OUTPATIENT
Start: 2021-03-09 | End: 2021-09-14 | Stop reason: SDUPTHER

## 2021-03-09 RX ORDER — GABAPENTIN 100 MG/1
CAPSULE ORAL
Qty: 270 CAP | Refills: 0 | Status: SHIPPED | OUTPATIENT
Start: 2021-03-09 | End: 2021-09-14 | Stop reason: SDUPTHER

## 2021-03-09 NOTE — PROGRESS NOTES
HISTORY OF PRESENT ILLNESS    Dean Alonso 1970 is a 45y.o. year old female that comes in today for follow-up: concussion    Since last appt Sx are pretty consistent but has had some recurrence in Sx due to increased stress due to tough shifts and HA/nausea worsened. Also lost her father since the last appt so a lot of emotional issues. Increased trazodone to 75mg and gabapentin 100mg PRN in morning and 200mg at bed as prior. Pain rated  8/10 top head head and described as pressure and splitting at times. Using tylenol/motrin or zofran if needed. Nausea much worse than last appt. Looking to resume overtime in the next week. 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 Photophobia: nearly resolved Phonophobia: nearly resolved Sleep issues:no More emotional: improved Dizziness: returned w/ nausea: w/ certain activities (screen/activity) uses zofran PRN.   LOC: no Trouble concentrating/foggy very rare     IMAGING: MRI brain normal 7/29/19 per report.     Neuropsych done 10/21/19 with report 11/8/19 Dr. Lynsey Jaime (report scanned). Social History     Socioeconomic History    Marital status: UNKNOWN     Spouse name: Not on file    Number of children: Not on file    Years of education: Not on file    Highest education level: Not on file   Tobacco Use    Smoking status: Never Smoker    Smokeless tobacco: Never Used   Substance and Sexual Activity    Alcohol use: Yes     Alcohol/week: 1.0 standard drinks     Types: 1 Glasses of wine per week     Comment: rarely     Drug use: Never     Current Outpatient Medications   Medication Sig Dispense Refill    gabapentin (NEURONTIN) 100 mg capsule TAKE 1 CAPSULE BY MOUTH EVERY MORNING AND 1 TO 2 CAPSULES EVERY NIGHT AT BEDTIME (Patient taking differently: TAKE 1 CAPSULE BY MOUTH EVERY night at bedtime) 270 Cap 0    traZODone (DESYREL) 50 mg tablet TAKE 1-2 TABLETS EVERY NIGHT (Patient taking differently: 50 mg.  TAKE 1.5 TABLETS EVERY NIGHT) 180 Tab 0    ondansetron hcl (Zofran) 4 mg tablet Take 1 Tab by mouth every eight (8) hours as needed for Nausea. 30 Tab 1    cetirizine (ZYRTEC) 10 mg tablet Take  by mouth.  fluticasone propionate (Flonase Allergy Relief) 50 mcg/actuation nasal spray 2 Sprays by Both Nostrils route daily.  loratadine (Claritin) 10 mg tablet Take 10 mg by mouth.  multivitamin (ONE A DAY) tablet Take 1 Tab by mouth daily.  cholecalciferol (VITAMIN D3) 1,000 unit cap Take  by mouth daily.  co-enzyme Q-10 (CO Q-10) 100 mg capsule Take 100 mg by mouth daily.  melatonin tab tablet Take 10 mg by mouth nightly.  acetaminophen (TYLENOL) 325 mg tablet Take  by mouth every four (4) hours as needed for Pain.  levothyroxine (SYNTHROID) 75 mcg tablet Take 88 mcg by mouth Daily (before breakfast).  ibuprofen (MOTRIN IB) 200 mg tablet Take  by mouth.  fish oil-omega-3 fatty acids 300-500 mg cap Take  by mouth.  omega 3-DHA-EPA-fish oil 1,000 mg (120 mg-180 mg) capsule Take 1 Cap by mouth daily.  diphenhydrAMINE (BENADRYL ALLERGY) 25 mg tablet Take 50 mg by mouth every six (6) hours as needed for Sleep.  zolpidem (AMBIEN) 5 mg tablet Take 5 mg by mouth. Past Medical History:   Diagnosis Date    Cancer ([facility] [address]     skin cancer (basil cell carcinoma)    Hashimoto's disease      Family History   Problem Relation Age of Onset    Hypertension Mother     Cancer Mother          ROS:  See HPI. Objective:  BP (!) 143/85   Pulse 67   Temp 96.9 °F (36.1 °C)   Resp 15   Ht 5' 7\" (1.702 m)   Wt 162 lb 12.8 oz (73.8 kg)   BMI 25.50 kg/m²   ENT: Hearing Intact. NECK:  Spurling negative  NEURO:  CN 2-12 grossly Intact. DTRs normal biceps, triceps, patellar and Achilles bilateral .  Sensation intact to light touch.  within normal limits rapid alternating movements. Romberg/pronator drift within normal limits. Tandem leg balance test (VINAY) 2 errors.   Conjugate gaze to 8cm w/o Sx.  MS: Gait and Station normal.  no clubbing/cyanosis. Strength/tone normal throughout upper and lower extremities. Assessment/Plan:     ICD-10-CM ICD-9-CM    1. Concussion without loss of consciousness, subsequent encounter  S06.0X0D V58.89 traZODone (DESYREL) 50 mg tablet     850.0 ondansetron hcl (Zofran) 4 mg tablet      gabapentin (NEURONTIN) 100 mg capsule      DISCONTINUED: gabapentin (NEURONTIN) 100 mg capsule      DISCONTINUED: traZODone (DESYREL) 50 mg tablet   2. Work related injury  Y99.0 959.9 traZODone (DESYREL) 50 mg tablet      ondansetron hcl (Zofran) 4 mg tablet      gabapentin (NEURONTIN) 100 mg capsule      DISCONTINUED: gabapentin (NEURONTIN) 100 mg capsule      DISCONTINUED: traZODone (DESYREL) 50 mg tablet       Patient (or guardian if minor) verbalizes understanding of evaluation and plan. Has reached MMI and will refill Rx 6 month [facility] for 1 month. Total time spent on encounter including chart/imaging/lab review and evaluation/documentation/demo home program/coordination of care but not including time for any procedures/manipulation 42 minutes.

## 2021-03-09 NOTE — PATIENT INSTRUCTIONS
Continue taking medication as prescribed. Follow up in 6 months Search YouTube for my channel: 
 
Dr. Brie Cox

## 2021-03-09 NOTE — PROGRESS NOTES
Pt states that she hasnt been sleeping well recently with the nausea and headaches. Pt states that she had a few stressful weeks since last visit. Pt went up to 75 mg of Trazodone with better sleep periods.

## 2021-03-09 NOTE — LETTER
NOTIFICATION RETURN TO WORK / SCHOOL 
 
3/9/2021 9:09 AM 
 
Ms. Charlynn Bloch [address] [address] To Whom It May Concern: 
 
Charlynn Bloch is currently under the care of [facility] She will return to work/school on: 3/9/2021. Has reached MMI from concussive workplace injury sustained 3/13/2019. Will need treatment with  trazodone, zofran, and gabapentin lifelong following this injury. If there are questions or concerns please have the patient contact our office.  
 
 
 
Sincerely, 
 
 
Tobias Cheung, DO

## 2021-03-09 NOTE — PROGRESS NOTES
AVS reviewed: YES  HEP: AT reviewed  Resources Provided: YES Both Videos & Photos  Patient questions/concerns answered: YES pt had question about where the medication was being sent to   Patient verbalized understanding of treatment plan: YES

## 2021-09-14 ENCOUNTER — OFFICE VISIT (OUTPATIENT)
Dept: ORTHOPEDIC SURGERY | Age: 51
End: 2021-09-14
Payer: COMMERCIAL

## 2021-09-14 VITALS
HEART RATE: 77 BPM | RESPIRATION RATE: 17 BRPM | OXYGEN SATURATION: 99 % | HEIGHT: 67 IN | WEIGHT: 160.6 LBS | BODY MASS INDEX: 25.21 KG/M2
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
## 2021-09-14 DIAGNOSIS — S06.0X0D CONCUSSION WITHOUT LOSS OF CONSCIOUSNESS, SUBSEQUENT ENCOUNTER: Primary | ICD-10-CM

## 2021-09-14 PROCEDURE — 99215 OFFICE O/P EST HI 40 MIN: CPT | Performed by: FAMILY MEDICINE

## 2021-09-14 RX ORDER — GABAPENTIN 100 MG/1
CAPSULE ORAL
Qty: 270 CAPSULE | Refills: 1 | Status: SHIPPED | OUTPATIENT
Start: 2021-09-14 | End: 2022-02-14 | Stop reason: SDUPTHER

## 2021-09-14 RX ORDER — ZOLPIDEM TARTRATE 5 MG/1
2.5-5 TABLET ORAL
Qty: 90 TABLET | Refills: 0 | Status: SHIPPED | OUTPATIENT
Start: 2021-09-14

## 2021-09-14 RX ORDER — ONDANSETRON 4 MG/1
4 TABLET, FILM COATED ORAL
Qty: 90 TABLET | Refills: 1 | Status: SHIPPED | OUTPATIENT
Start: 2021-09-14

## 2021-09-14 RX ORDER — TRAZODONE HYDROCHLORIDE 50 MG/1
TABLET ORAL
Qty: 90 TABLET | Refills: 1 | Status: SHIPPED | OUTPATIENT
Start: 2021-09-14 | End: 2022-01-13 | Stop reason: SDUPTHER

## 2021-09-14 NOTE — PROGRESS NOTES
HISTORY OF PRESENT ILLNESS    Seth De La Fuente 1970 is a 45y.o. year old female that comes in today for follow-up: concussion    Since last appt Sx are consistent but still gets HA less than weekly, but biggest issue is nausea and poor sleep. Patient had been down to using trazodone 25mg but back up to 75mg and gabapentin had been able to go without it unless 24hour shift will take at night 100mg. When taking trazodone and gabapentin and melatonin will not sleep well but groggy. Has had issues with passing of father earlier this year and did a lot of work at [employer] in May 2021. Pain rated  0 - No pain/10 top left head and described as burning. Is working regular hours with rare OT. Had been on Ambien prior to injury. Still significant nausea with eye motions and screen time or in a car not being able to see out side if not actively working. Also issues with accidentally switch words (q-tip becomes tooth pick). Photophobia: nearly resolved Phonophobia: nearly resolved Sleep issues:no More emotional: improved Dizziness: returned w/ nausea: w/ certain activities (screen/activity) uses zofran PRN.   LOC: no Trouble concentrating/foggy very rare     IMAGING: MRI brain normal 7/29/19 per report.     Neuropsych done 10/21/19 with report 11/8/19 Dr. Hood Araujo (report scanned). Social History     Socioeconomic History    Marital status: UNKNOWN     Spouse name: Not on file    Number of children: Not on file    Years of education: Not on file    Highest education level: Not on file   Tobacco Use    Smoking status: Never Smoker    Smokeless tobacco: Never Used   Vaping Use    Vaping Use: Never used   Substance and Sexual Activity    Alcohol use:  Yes     Alcohol/week: 1.0 standard drinks     Types: 1 Glasses of wine per week     Comment: rarely     Drug use: Never     Social Determinants of Health     Financial Resource Strain:     Difficulty of Paying Living Expenses: Food Insecurity:     Worried About Running Out of Food in the Last Year:     [address] in the Last Year:    Transportation Needs:     Lack of Transportation (Medical):  Lack of Transportation (Non-Medical):    Physical Activity:     Days of Exercise per Week:     Minutes of Exercise per Session:    Stress:     Feeling of Stress :    Social Connections:     Frequency of Communication with Friends and Family:     Frequency of Social Gatherings with Friends and Family:     Attends [employer] Services:     Active Member of Clubs or Organizations:     Attends Club or Organization Meetings:     Marital Status:      Current Outpatient Medications   Medication Sig Dispense Refill    traZODone (DESYREL) 50 mg tablet TAKE 1-2 TABLETS EVERY NIGHT 90 Tab 0    ondansetron hcl (Zofran) 4 mg tablet Take 1 Tab by mouth every eight (8) hours as needed for Nausea. 90 Tab 0    gabapentin (NEURONTIN) 100 mg capsule TAKE 1 CAPSULE BY MOUTH EVERY MORNING AND 1 TO 2 CAPSULES EVERY NIGHT AT BEDTIME 270 Cap 0    cetirizine (ZYRTEC) 10 mg tablet Take  by mouth.  loratadine (Claritin) 10 mg tablet Take 10 mg by mouth.  multivitamin (ONE A DAY) tablet Take 1 Tab by mouth daily.  cholecalciferol (VITAMIN D3) 1,000 unit cap Take  by mouth daily.  co-enzyme Q-10 (CO Q-10) 100 mg capsule Take 100 mg by mouth daily.  melatonin tab tablet Take 10 mg by mouth nightly.  acetaminophen (TYLENOL) 325 mg tablet Take  by mouth every four (4) hours as needed for Pain.  levothyroxine (SYNTHROID) 75 mcg tablet Take 88 mcg by mouth Daily (before breakfast).  ibuprofen (MOTRIN IB) 200 mg tablet Take  by mouth.  fluticasone propionate (Flonase Allergy Relief) 50 mcg/actuation nasal spray 2 Sprays by Both Nostrils route daily. (Patient not taking: Reported on 9/14/2021)      fish oil-omega-3 fatty acids 300-500 mg cap Take  by mouth.  (Patient not taking: Reported on 9/14/2021)      omega 3-DHA-EPA-fish oil 1,000 mg (120 mg-180 mg) capsule Take 1 Cap by mouth daily. (Patient not taking: Reported on 9/14/2021)      diphenhydrAMINE (BENADRYL ALLERGY) 25 mg tablet Take 50 mg by mouth every six (6) hours as needed for Sleep. (Patient not taking: Reported on 9/14/2021)      zolpidem (AMBIEN) 5 mg tablet Take 5 mg by mouth. (Patient not taking: Reported on 9/14/2021)       Past Medical History:   Diagnosis Date    Cancer ([facility] [address]     skin cancer (basil cell carcinoma)    Hashimoto's disease      Family History   Problem Relation Age of Onset    Hypertension Mother     Cancer Mother          ROS:  See HPI       Objective:  Pulse 77   Resp 17   Ht 5' 7\" (1.702 m)   Wt 160 lb 9.6 oz (72.8 kg)   SpO2 99%   BMI 25.15 kg/m²   ENT: Hearing Intact. NECK:  Spurling negative  NEURO:  CN 2-12 grossly Intact.  DTRs normal biceps, triceps, patellar and Achilles bilateral .  Sensation intact to light touch.  within normal limits rapid alternating movements.  Romberg/pronator drift within normal limits.  Tandem leg balance test (VINAY) 2 errors.  Conjugate gaze to 8cm w/o Sx.  MS: Gait and Station normal.  no clubbing/cyanosis.  Strength/tone normal throughout upper and lower extremities. Assessment/Plan:     ICD-10-CM ICD-9-CM    1. Concussion without loss of consciousness, subsequent encounter  S06.0X0D V58.89 zolpidem (AMBIEN) 5 mg tablet     850.0    2. Work related injury  Y99.0 959.9        Patient (or guardian if minor) verbalizes understanding of evaluation and plan. Will continue avoid aggravating activities and discussed optimize trazadone, melatonin, gabapentin and add Ambien PRN. Total time spent on encounter including chart/imaging/lab review and evaluation/documentation/demo home program/coordination of care/form completion but not including time for any procedures/manipulation 43 minutes.
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
## 2022-01-13 DIAGNOSIS — S06.0X0D CONCUSSION WITHOUT LOSS OF CONSCIOUSNESS, SUBSEQUENT ENCOUNTER: ICD-10-CM

## 2022-01-13 RX ORDER — TRAZODONE HYDROCHLORIDE 50 MG/1
TABLET ORAL
Qty: 180 TABLET | Refills: 1 | Status: SHIPPED | OUTPATIENT
Start: 2022-01-13

## 2022-02-14 DIAGNOSIS — S06.0X0D CONCUSSION WITHOUT LOSS OF CONSCIOUSNESS, SUBSEQUENT ENCOUNTER: ICD-10-CM
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
## 2022-02-14 RX ORDER — GABAPENTIN 100 MG/1
CAPSULE ORAL
Qty: 270 CAPSULE | Refills: 0 | Status: SHIPPED | OUTPATIENT
Start: 2022-02-14

## 2022-02-14 NOTE — PROGRESS NOTES
Rx gabapentin sent in as requested. Unfortunately, PCP refilling gabapentin is likely the best option for her unfortunately.

## 2023-01-06 NOTE — PATIENT INSTRUCTIONS
Continue with home exercises and physical therapy.     Can try 30mg Remeron with Melatonin Doxycycline Pregnancy And Lactation Text: This medication is Pregnancy Category D and not consider safe during pregnancy. It is also excreted in breast milk but is considered safe for shorter treatment courses.